# Patient Record
Sex: MALE | Race: OTHER | Employment: UNEMPLOYED | URBAN - METROPOLITAN AREA
[De-identification: names, ages, dates, MRNs, and addresses within clinical notes are randomized per-mention and may not be internally consistent; named-entity substitution may affect disease eponyms.]

---

## 2019-10-21 PROCEDURE — 88305 TISSUE EXAM BY PATHOLOGIST: CPT | Performed by: SURGERY

## 2019-10-22 ENCOUNTER — LAB REQUISITION (OUTPATIENT)
Dept: LAB | Facility: HOSPITAL | Age: 59
End: 2019-10-22
Payer: COMMERCIAL

## 2019-10-22 DIAGNOSIS — Z01.89 ENCOUNTER FOR OTHER SPECIFIED SPECIAL EXAMINATIONS: ICD-10-CM

## 2020-11-12 NOTE — LETTER
3/2/2023          To Whom It May Concern:    Anabell Lugo is currently under my medical care and may return to work at this time. Please excuse Sin for 3 days. He may return to work on 3/2/23. Activity is restricted as follows: none. If you require additional information please contact our office.         Sincerely,      Jass Ulrich MD
3/2/2023          To Whom It May Concern:    Justin Montilla is currently under my medical care and may return to work at this time. Please excuse Sin for 5 days. He may return to work on 2/20/23. Activity is restricted as follows: none. If you require additional information please contact our office.         Sincerely,      Yvette Wallace MD
How Severe Is Your Scar?: severe
Is This A New Presentation, Or A Follow-Up?: Scar

## 2022-01-03 ENCOUNTER — HOSPITAL ENCOUNTER (EMERGENCY)
Facility: HOSPITAL | Age: 62
Discharge: HOME | End: 2022-01-03
Attending: EMERGENCY MEDICINE

## 2022-01-03 ENCOUNTER — APPOINTMENT (OUTPATIENT)
Dept: CT IMAGING | Facility: HOSPITAL | Age: 62
End: 2022-01-03
Attending: EMERGENCY MEDICINE

## 2022-01-03 VITALS
OXYGEN SATURATION: 98 % | RESPIRATION RATE: 18 BRPM | WEIGHT: 185 LBS | HEART RATE: 81 BPM | TEMPERATURE: 98 F | DIASTOLIC BLOOD PRESSURE: 76 MMHG | SYSTOLIC BLOOD PRESSURE: 148 MMHG

## 2022-01-03 DIAGNOSIS — R33.9 URINARY RETENTION: Primary | ICD-10-CM

## 2022-01-03 LAB
BILIRUB UR QL: NEGATIVE
GLUCOSE UR-MCNC: NEGATIVE MG/DL
KETONES UR-MCNC: NEGATIVE MG/DL
LEUKOCYTE ESTERASE UR QL STRIP.AUTO: NEGATIVE
NITRITE UR QL STRIP.AUTO: NEGATIVE
PH UR: 7 [PH] (ref 5–8)
PROT UR-MCNC: 100 MG/DL
RBC #/AREA URNS AUTO: >10 /HPF
SP GR UR STRIP: 1.01 (ref 1–1.03)
UROBILINOGEN UR STRIP-ACNC: <2

## 2022-01-03 PROCEDURE — 99284 EMERGENCY DEPT VISIT MOD MDM: CPT

## 2022-01-03 PROCEDURE — 81001 URINALYSIS AUTO W/SCOPE: CPT | Performed by: EMERGENCY MEDICINE

## 2022-01-03 PROCEDURE — 74176 CT ABD & PELVIS W/O CONTRAST: CPT | Performed by: EMERGENCY MEDICINE

## 2022-01-03 PROCEDURE — 51702 INSERT TEMP BLADDER CATH: CPT

## 2022-01-03 RX ORDER — TAMSULOSIN HYDROCHLORIDE 0.4 MG/1
0.4 CAPSULE ORAL DAILY
Qty: 14 CAPSULE | Refills: 0 | Status: SHIPPED | OUTPATIENT
Start: 2022-01-03 | End: 2022-01-17

## 2022-01-03 NOTE — ED PROVIDER NOTES
Patient Seen in: [facility] AND [facility] Emergency Department      History   Patient presents with:  Urinary Symptoms    Stated Complaint: painful urination    Subjective:   HPI    62-year-old male presents for evaluation of hematuria.   Patient reports since 1 and neck supple. Skin:     General: Skin is warm and dry. Findings: No rash. Neurological:      General: No focal deficit present. Mental Status: He is alert and oriented to person, place, and time.                ED Course     Labs Reviewed

## 2022-01-06 ENCOUNTER — TELEPHONE (OUTPATIENT)
Dept: SURGERY | Facility: CLINIC | Age: 62
End: 2022-01-06

## 2022-01-06 ENCOUNTER — OFFICE VISIT (OUTPATIENT)
Dept: SURGERY | Facility: CLINIC | Age: 62
End: 2022-01-06

## 2022-01-06 VITALS
HEART RATE: 90 BPM | BODY MASS INDEX: 29.03 KG/M2 | SYSTOLIC BLOOD PRESSURE: 124 MMHG | RESPIRATION RATE: 16 BRPM | DIASTOLIC BLOOD PRESSURE: 76 MMHG | WEIGHT: 185 LBS | HEIGHT: 67 IN

## 2022-01-06 DIAGNOSIS — R33.8 ACUTE URINARY RETENTION: Primary | ICD-10-CM

## 2022-01-06 PROCEDURE — 3074F SYST BP LT 130 MM HG: CPT | Performed by: UROLOGY

## 2022-01-06 PROCEDURE — 3078F DIAST BP <80 MM HG: CPT | Performed by: UROLOGY

## 2022-01-06 PROCEDURE — 99204 OFFICE O/P NEW MOD 45 MIN: CPT | Performed by: UROLOGY

## 2022-01-06 PROCEDURE — 3008F BODY MASS INDEX DOCD: CPT | Performed by: UROLOGY

## 2022-01-07 NOTE — PROGRESS NOTES
SUBJECTIVE:  Shanna Loya is a 62year old male who presents for a consultation at the request of, and a copy of this note will be sent to, N/A, for evaluation of  urinary retention. He states that the problem is unchanged.  Symptoms include presented to the e production,chest pain or pleurisy. CARDIOVASCULAR:  Negative for pain or chest discomfort, dizziness or fainting, palpitations, leg swelling, nocturia, or claudication.   GASTROINTESTINAL:  Negative for nausea, vomiting, diarrhea, constipation, heartburn o suggesting a chronic issue with bladder outlet obstruction. He was started in the emergency department on tamsulosin and I will renew this medication for the time being.   Bailey Lopez will likely require an office cystoscopy to assess prostatic and bladder anatomy

## 2022-01-19 NOTE — TELEPHONE ENCOUNTER
Spoke with patient. Patient states he still does not have insurance through his work. Patient did not want to scheduled cystoscopy at this time. Patient is wondering if he can just have catheter removed. Advised patient of MD's recommendations from 1/6/2022. Patient states he will call back. No future appointments.      -I told the patient I do not believe a voiding trial is appropriate given that he had hydronephrosis on his imaging studies suggesting a chronic issue with bladder outlet obstruction. He was started in the emergency department on tamsulosin and I will renew this medication for the time being.  -He will likely require an office cystoscopy to assess prostatic and bladder anatomy in anticipation of procedural management of bladder outlet obstruction. We gave the patient the information for financial services in the hospital although he states that he will be getting medical insurance in the next week or so upon employment. We agreed that he would contact the office in the next 1 to 2 weeks to update us on his status. He would prefer not to be scheduled for cystoscopy at this time pending insurance issues. He understands that a catheter will need to be replaced at least every 4 weeks.

## 2022-03-07 ENCOUNTER — TELEPHONE (OUTPATIENT)
Dept: SURGERY | Facility: CLINIC | Age: 62
End: 2022-03-07

## 2022-03-07 NOTE — TELEPHONE ENCOUNTER
Pt's niece called. Pt does not speak english. Pt wants to schedule an appointment to have catheter removed.   Please call

## 2022-03-10 ENCOUNTER — TELEPHONE (OUTPATIENT)
Dept: SURGERY | Facility: CLINIC | Age: 62
End: 2022-03-10

## 2022-03-10 NOTE — TELEPHONE ENCOUNTER
I tried to reach pt with the assistance of Belgian interpretor Mercy Louise # [id number] and had to [address] HIS CATH CHANGE HE HAD THIS MORNING AT 9 AM.

## 2022-05-04 ENCOUNTER — TELEPHONE (OUTPATIENT)
Dept: SURGERY | Facility: CLINIC | Age: 62
End: 2022-05-04

## 2022-05-05 NOTE — TELEPHONE ENCOUNTER
Asked pt if he can come earlier to appt tomorrow.  Pt is off work at 1:30pm and will come afterwards, will be here around 2pm.

## 2022-05-06 ENCOUNTER — NURSE ONLY (OUTPATIENT)
Dept: SURGERY | Facility: CLINIC | Age: 62
End: 2022-05-06
Payer: COMMERCIAL

## 2022-05-06 DIAGNOSIS — R33.9 URINARY RETENTION: Primary | ICD-10-CM

## 2022-05-06 PROCEDURE — 51702 INSERT TEMP BLADDER CATH: CPT | Performed by: UROLOGY

## 2022-05-06 NOTE — PROGRESS NOTES
Pt here today for catheter change. Pt states he has not had catheter changed since ER visit of 1/3/22. Educated pt that catheter needs to be changed on a monthly basis, otherwise he is at risk for infection. Verbalized understanding. Pt placed self onto exam table. Pt did not have stat lock on leg and pt also states his leg bag had fallen off yesterday and pt was using zip lock bag instead for urine collection. Aspirated 6 ml from wyman catheter and removed catheter without difficulty. Head of penis noted to have dried blood, cleaned off with wet wipe. Proceeded to set up sterile field. Cleaned urethral opening with iodine swabs and inserted urojet lidocaine gel and waited 2-3 minutes for med effectiveness. Afterwards inserted 16 F coude catheter without complications, return of pink clear urine noted. Urine culture obtained and sent to rule out infection. Stat lock applied to right thigh and leg bag to right lower leg. Extra supplies given to pt. Notified pt that we will call him back with urine results.

## 2022-07-19 ENCOUNTER — OFFICE VISIT (OUTPATIENT)
Dept: SURGERY | Facility: CLINIC | Age: 62
End: 2022-07-19
Payer: COMMERCIAL

## 2022-07-19 DIAGNOSIS — Z87.440 HISTORY OF RECURRENT UTIS: Primary | ICD-10-CM

## 2022-07-19 DIAGNOSIS — R33.9 URINARY RETENTION: ICD-10-CM

## 2022-07-19 DIAGNOSIS — N40.1 BENIGN PROSTATIC HYPERPLASIA WITH URINARY OBSTRUCTION: ICD-10-CM

## 2022-07-19 DIAGNOSIS — N13.8 BENIGN PROSTATIC HYPERPLASIA WITH URINARY OBSTRUCTION: ICD-10-CM

## 2022-07-19 PROCEDURE — 99214 OFFICE O/P EST MOD 30 MIN: CPT | Performed by: NURSE PRACTITIONER

## 2022-07-19 PROCEDURE — 51702 INSERT TEMP BLADDER CATH: CPT | Performed by: NURSE PRACTITIONER

## 2022-07-19 RX ORDER — TAMSULOSIN HYDROCHLORIDE 0.4 MG/1
0.4 CAPSULE ORAL DAILY
Qty: 90 CAPSULE | Refills: 3 | Status: SHIPPED | OUTPATIENT
Start: 2022-07-19 | End: 2023-07-14

## 2022-07-19 RX ORDER — FINASTERIDE 5 MG/1
5 TABLET, FILM COATED ORAL DAILY
Qty: 90 TABLET | Refills: 3 | Status: SHIPPED | OUTPATIENT
Start: 2022-07-19

## 2022-07-19 RX ORDER — CEPHALEXIN 250 MG/1
500 CAPSULE ORAL 2 TIMES DAILY
COMMUNITY
Start: 2022-06-26

## 2022-07-19 NOTE — PROGRESS NOTES
I was asked by [facility] to collect a urine specimen from pt's wmyan cath and then change out his wyman cath with a 16 FR coude tip cath to leg drainage bag. I assisted pt onto the exam table and also to lower his bottom clothing to his ankles. I detached the existing leg bag and then I placed a wyman clamp on and told pt that I would return in 15 min to collect the urine specimen directly from his bladder. I also gave the pt 2 cups of water to drink. When I returned to the room after the time elapsed and I released the wyman clamp and I allowed the retained urine to drain into a sterile urine cup and I then deflated the wyman cath balloon of its contents ( 10 ml) and I removed the cath. I then prepped and draped the pt in sterile fashion and I proceeded to instill about 2/3 the amt of a Urojet (Lidocaine HCL 2 % gel- 20 ml) into the pt's urethra. I then constricted the head of the penis for about 1-2 min to prevent the gel from leaking out and to also allow sufficient numbing time before the cath placement. After the time elapsed I then proceeded to  insert a new 16 FR coude tipped wyman catheter without difficulty and received a urine return of about 30 ml of cloudy yellow urine. Pt tolerated this well also. I then attached the leg bag directly to the wyman cath at pt's request and I secured the wyman cath in a stat lock at pt's upper inner Rt thigh. I also secured the leg bag with the velcro straps. I then reviewed the use of aseptic technique when performing catheter care and when changing over to the night drainage bag. I then assisted the pt off the exam table and to redress. I gave pt a N/V appt for thurs 7/21 at 10 am for CIC teaching with a 14 Fr coude tip cath 3 times daily indefinitely and he should also record the cathed and natural voided outputs' for a few days and call back to report.  I also told him that we should have the results of his urine culture by then and if it is positive we will prescribe accordingly. Pt verbalized understanding and compliance.

## 2022-07-21 ENCOUNTER — NURSE ONLY (OUTPATIENT)
Dept: SURGERY | Facility: CLINIC | Age: 62
End: 2022-07-21
Payer: COMMERCIAL

## 2022-07-21 ENCOUNTER — TELEPHONE (OUTPATIENT)
Dept: SURGERY | Facility: CLINIC | Age: 62
End: 2022-07-21

## 2022-07-21 VITALS — DIASTOLIC BLOOD PRESSURE: 75 MMHG | SYSTOLIC BLOOD PRESSURE: 133 MMHG | HEART RATE: 86 BPM | RESPIRATION RATE: 16 BRPM

## 2022-07-21 DIAGNOSIS — R33.9 URINARY RETENTION: Primary | ICD-10-CM

## 2022-07-21 PROCEDURE — 51700 IRRIGATION OF BLADDER: CPT | Performed by: NURSE PRACTITIONER

## 2022-07-21 PROCEDURE — 3078F DIAST BP <80 MM HG: CPT | Performed by: NURSE PRACTITIONER

## 2022-07-21 PROCEDURE — 3075F SYST BP GE 130 - 139MM HG: CPT | Performed by: NURSE PRACTITIONER

## 2022-07-21 RX ORDER — SULFAMETHOXAZOLE AND TRIMETHOPRIM 800; 160 MG/1; MG/1
1 TABLET ORAL 2 TIMES DAILY
Qty: 14 TABLET | Refills: 0 | Status: SHIPPED | OUTPATIENT
Start: 2022-07-21 | End: 2022-07-28

## 2022-07-21 NOTE — TELEPHONE ENCOUNTER
I tried to reach pt with the assistance of Omani interpretor Mercy Ramirez # [id number] and had to Brianna Nolasco. I want to tell pt that he does not need to have the N/V appt and the O/V appt 2 days apart and that I can change his cath at the time of his O/V with MAURILIO on 8/19.

## 2022-07-21 NOTE — PROGRESS NOTES
I called the pt into the exam room and introduced myself and immediately got the Croatian Dimple Diaz # [phone] on the phone. I verified pt's name and  and I proceeded to explain [facility]'s instructions to fill his bladder with saline, remove the wyman cath and then teach CIC. Pt agreed to proceed. I first explained all that I will do and then I verbally explained the process of CIC, and I also used the [employer] booklet with the pictures and I used body language also. This all took about 1 hr since the interpretor needed me to explain what to translate in small segments and he also had to write down some things and ask for spelling. After all of this pt then stated that he did not want to learn CIC. He explained that the conditions at work were not amenable to it and he had no place to store the hat, urinal and cath supplies. I then explained that he would need to then keep the wyman cath and have it changed monthly until he could have the cysto procedure in Oct. I also explained that I could ask [facility] id he can have voiding trials while waiting for the next cath change and I explained that process. Pt agreed to this but I had to s/w VALORIE who in turn stated that MAURILIO did not want him to have voiding trials because he was non-compliant with his cath changes and having the office cysto and thereby developed a severe UTI with hydro. I suggested to pt that he can make an appt to discuss all of this further with MAURILIO. But he will also need a N/V appt to change his wyman cath in 4 weeks and every 4 weeks until he has the cysto appt. I also told him that he needs to make sure to start the Tamsulosin and Finasteride ASAP which were sent to his [address]. I also explained what is involved with the cysto procedure in detail since he stated that nothing was explained at that last appt. Pt agreed to the N/V and a f/u appt with MAURILIO to discuss further.  I helped him arrange a N/V for  and a f/u with MAURILIO for  and after realizing that the appt's were 2 days apart I called pt back with Bulgarian interpretor Dimple Redd # [id number] but had to [place]. I will have pt just keep the O/V appt on 8/19 and we can change his cath then.

## 2022-07-22 ENCOUNTER — TELEPHONE (OUTPATIENT)
Dept: SURGERY | Facility: CLINIC | Age: 62
End: 2022-07-22

## 2022-07-22 NOTE — TELEPHONE ENCOUNTER
----- Message from SAIDA Carr sent at 7/21/2022  4:31 PM CDT -----  Please let patient know his urine culture is positive for bacteria. I am going to send Bactrim DS 1 tab by mouth twice a day for 7 days to your pharmacy. Urology follow up as planned.

## 2022-07-22 NOTE — TELEPHONE ENCOUNTER
HASMUKHTCB. Left msg notifying pt to start medication that was sent to pharmacy as soon as he is able to and to call back to confirm he received this msg.

## 2022-07-29 NOTE — TELEPHONE ENCOUNTER
I called pt with the assistance of Georgian interpretor Sherry, ID # [id number] and we had to [place] again. I called pt's [facility] to see if pt picked up the Bactrim DS and I s/w the pharmacist Cruz Sayres and she informed that he did not. I will send pt a my chart message also.

## 2022-08-01 NOTE — TELEPHONE ENCOUNTER
Called pt again with the assistance of my co-worker Yair Cagle and she LM for pt to please c/b for urgent results and instructions to start meds. I also mailed the my chart letter I sent him. My co-worker Yair Cagle stated that she reached out to pt's niece Lenora Ulrich in the past to arrange an appt for pt for cath change. I tried to reach her again but had to [place].

## 2022-08-05 NOTE — TELEPHONE ENCOUNTER
I called the Alayna to see if pt picked up the ABX sent on 7/21 and I s/w Elsi and she stated he did not. I called pt's niece Johnny Bojorquez again and told her that we have not been able to reach pt and he had an infection and we prescribed abx and he has never picked them up. She agreed to try to get a msg to him and have him call and she took our ph #.

## 2022-08-17 ENCOUNTER — NURSE ONLY (OUTPATIENT)
Dept: SURGERY | Facility: CLINIC | Age: 62
End: 2022-08-17
Payer: COMMERCIAL

## 2022-08-17 DIAGNOSIS — R33.9 URINARY RETENTION: Primary | ICD-10-CM

## 2022-08-17 NOTE — PROGRESS NOTES
Pt here today for monthly catheter change. Pt states he has appt with MAURILIO this upcoming Friday 8/19. Asking if ok to just wait til appt for catheter change or to see if ok to have catheter removed. I notified pt that we had sent a voicemail notifying him that he didn't need to come today for catheter change because we could do it during his office visit. However pt had positive urine culture on 7/19/22 but office was never able to get a hold of him to notify him to start antibiotic. Pt states he didn't listen to voicemail or that he didn't get message from niece in regards to this so he never started antibiotic. Therefore obtained urine specimen today from wyman catheter for urine culture and instructed pt to keep appt for Friday and that we will notify him of results on Friday. Verbalized understanding.

## 2022-08-19 ENCOUNTER — OFFICE VISIT (OUTPATIENT)
Dept: SURGERY | Facility: CLINIC | Age: 62
End: 2022-08-19
Payer: COMMERCIAL

## 2022-08-19 DIAGNOSIS — R33.9 URINARY RETENTION: Primary | ICD-10-CM

## 2022-08-19 DIAGNOSIS — N13.8 BENIGN PROSTATIC HYPERPLASIA WITH URINARY OBSTRUCTION: ICD-10-CM

## 2022-08-19 DIAGNOSIS — N40.1 BENIGN PROSTATIC HYPERPLASIA WITH URINARY OBSTRUCTION: ICD-10-CM

## 2022-08-19 PROCEDURE — 99213 OFFICE O/P EST LOW 20 MIN: CPT | Performed by: UROLOGY

## 2022-08-19 RX ORDER — SULFAMETHOXAZOLE AND TRIMETHOPRIM 800; 160 MG/1; MG/1
1 TABLET ORAL 2 TIMES DAILY
Qty: 14 TABLET | Refills: 0 | Status: SHIPPED | OUTPATIENT
Start: 2022-08-19 | End: 2022-08-26

## 2022-08-19 NOTE — PROGRESS NOTES
-I was ordered by MAURILIO to complete monthly wyman change for pt; identity verified with name & ; procedure explained.  -Pt positions self on exam table; draped for privacy to lower jeans/ underwear to mid-thigh; aspirate 8ml balloon prime & remove wyman/ legbag; penis tip prepped with betadine; instill thru urethra 2% lido-gel 2-3min for pain management; insert 16Fr coude & connect directly to legbag; secured to bifurcation with stat-lock applied to right upper thigh; elastic bands around leg to top/ bottom of legbag; pt redressed self. -Next monthly cath change 9/15/22.  -Encounter complete.

## 2022-09-16 ENCOUNTER — NURSE ONLY (OUTPATIENT)
Dept: SURGERY | Facility: CLINIC | Age: 62
End: 2022-09-16
Payer: COMMERCIAL

## 2022-09-16 DIAGNOSIS — R33.9 URINARY RETENTION: Primary | ICD-10-CM

## 2022-09-16 PROCEDURE — 51702 INSERT TEMP BLADDER CATH: CPT | Performed by: UROLOGY

## 2022-09-16 NOTE — PROGRESS NOTES
Placed pt in room and verified name and . Pt placed self onto exam table and removed bottoms. Removed stat lock and aspirated 9 ml from catheter balloon and removed without difficulty. Proceeded to set up sterile field. Used 3 iodine swabs to clean urethral opening. Afterwards inserted lidocaine urojet gel into urethra and waited 2-3 minutes for med to take effect. Asked pt to take deep breath and inserted 16 F coude catheter until return of clear yellow urine noted. Inflated catheter balloon with 10 ml sterile water. Stat lock and leg bag applied to left leg and extra supplies given. Pt has appt for cystoscopy next month 10/25/22. Wondering if appt can be moved up. Will check on availability.

## 2022-10-25 ENCOUNTER — EKG ENCOUNTER (OUTPATIENT)
Dept: LAB | Facility: HOSPITAL | Age: 62
End: 2022-10-25
Attending: UROLOGY
Payer: COMMERCIAL

## 2022-10-25 ENCOUNTER — LAB ENCOUNTER (OUTPATIENT)
Dept: LAB | Facility: HOSPITAL | Age: 62
End: 2022-10-25
Attending: UROLOGY
Payer: COMMERCIAL

## 2022-10-25 ENCOUNTER — PROCEDURE (OUTPATIENT)
Dept: SURGERY | Facility: CLINIC | Age: 62
End: 2022-10-25
Payer: COMMERCIAL

## 2022-10-25 VITALS — SYSTOLIC BLOOD PRESSURE: 112 MMHG | DIASTOLIC BLOOD PRESSURE: 74 MMHG | HEART RATE: 74 BPM

## 2022-10-25 DIAGNOSIS — N13.8 BENIGN PROSTATIC HYPERPLASIA WITH URINARY OBSTRUCTION: Primary | ICD-10-CM

## 2022-10-25 DIAGNOSIS — R33.9 URINARY RETENTION: ICD-10-CM

## 2022-10-25 DIAGNOSIS — Z12.5 SCREENING FOR PROSTATE CANCER: ICD-10-CM

## 2022-10-25 DIAGNOSIS — N40.1 BENIGN PROSTATIC HYPERPLASIA WITH URINARY OBSTRUCTION: Primary | ICD-10-CM

## 2022-10-25 DIAGNOSIS — Z01.818 PREOP EXAMINATION: ICD-10-CM

## 2022-10-25 DIAGNOSIS — R31.0 GROSS HEMATURIA: ICD-10-CM

## 2022-10-25 LAB
ANION GAP SERPL CALC-SCNC: 5 MMOL/L (ref 0–18)
BASOPHILS # BLD AUTO: 0.04 X10(3) UL (ref 0–0.2)
BASOPHILS NFR BLD AUTO: 0.5 %
BUN BLD-MCNC: 16 MG/DL (ref 7–18)
BUN/CREAT SERPL: 14.2 (ref 10–20)
CALCIUM BLD-MCNC: 9.1 MG/DL (ref 8.5–10.1)
CHLORIDE SERPL-SCNC: 105 MMOL/L (ref 98–112)
CO2 SERPL-SCNC: 27 MMOL/L (ref 21–32)
COMPLEXED PSA SERPL-MCNC: 4.58 NG/ML (ref ?–4)
CREAT BLD-MCNC: 1.13 MG/DL
DEPRECATED RDW RBC AUTO: 44.1 FL (ref 35.1–46.3)
EOSINOPHIL # BLD AUTO: 0.11 X10(3) UL (ref 0–0.7)
EOSINOPHIL NFR BLD AUTO: 1.5 %
ERYTHROCYTE [DISTWIDTH] IN BLOOD BY AUTOMATED COUNT: 13 % (ref 11–15)
FASTING STATUS PATIENT QL REPORTED: YES
GFR SERPLBLD BASED ON 1.73 SQ M-ARVRAT: 74 ML/MIN/1.73M2 (ref 60–?)
GLUCOSE BLD-MCNC: 91 MG/DL (ref 70–99)
HCT VFR BLD AUTO: 43.1 %
HGB BLD-MCNC: 13.8 G/DL
IMM GRANULOCYTES # BLD AUTO: 0.03 X10(3) UL (ref 0–1)
IMM GRANULOCYTES NFR BLD: 0.4 %
LYMPHOCYTES # BLD AUTO: 1.81 X10(3) UL (ref 1–4)
LYMPHOCYTES NFR BLD AUTO: 24 %
MCH RBC QN AUTO: 29.8 PG (ref 26–34)
MCHC RBC AUTO-ENTMCNC: 32 G/DL (ref 31–37)
MCV RBC AUTO: 93.1 FL
MONOCYTES # BLD AUTO: 0.48 X10(3) UL (ref 0.1–1)
MONOCYTES NFR BLD AUTO: 6.4 %
NEUTROPHILS # BLD AUTO: 5.08 X10 (3) UL (ref 1.5–7.7)
NEUTROPHILS # BLD AUTO: 5.08 X10(3) UL (ref 1.5–7.7)
NEUTROPHILS NFR BLD AUTO: 67.2 %
OSMOLALITY SERPL CALC.SUM OF ELEC: 285 MOSM/KG (ref 275–295)
PLATELET # BLD AUTO: 241 10(3)UL (ref 150–450)
POTASSIUM SERPL-SCNC: 4 MMOL/L (ref 3.5–5.1)
RBC # BLD AUTO: 4.63 X10(6)UL
SODIUM SERPL-SCNC: 137 MMOL/L (ref 136–145)
WBC # BLD AUTO: 7.6 X10(3) UL (ref 4–11)

## 2022-10-25 PROCEDURE — 3074F SYST BP LT 130 MM HG: CPT | Performed by: UROLOGY

## 2022-10-25 PROCEDURE — 52000 CYSTOURETHROSCOPY: CPT | Performed by: UROLOGY

## 2022-10-25 PROCEDURE — 3078F DIAST BP <80 MM HG: CPT | Performed by: UROLOGY

## 2022-10-25 PROCEDURE — 80048 BASIC METABOLIC PNL TOTAL CA: CPT

## 2022-10-25 PROCEDURE — 93005 ELECTROCARDIOGRAM TRACING: CPT

## 2022-10-25 PROCEDURE — 93010 ELECTROCARDIOGRAM REPORT: CPT | Performed by: UROLOGY

## 2022-10-25 PROCEDURE — 36415 COLL VENOUS BLD VENIPUNCTURE: CPT

## 2022-10-25 PROCEDURE — 99214 OFFICE O/P EST MOD 30 MIN: CPT | Performed by: UROLOGY

## 2022-10-25 PROCEDURE — 85025 COMPLETE CBC W/AUTO DIFF WBC: CPT

## 2022-10-25 RX ORDER — CIPROFLOXACIN 500 MG/1
500 TABLET, FILM COATED ORAL ONCE
Status: DISCONTINUED | OUTPATIENT
Start: 2022-10-25 | End: 2022-10-25 | Stop reason: ALTCHOICE

## 2022-10-25 RX ORDER — TAMSULOSIN HYDROCHLORIDE 0.4 MG/1
0.4 CAPSULE ORAL DAILY
Qty: 90 CAPSULE | Refills: 3 | Status: SHIPPED | OUTPATIENT
Start: 2022-10-25 | End: 2023-10-20

## 2022-10-25 RX ORDER — CIPROFLOXACIN 500 MG/1
500 TABLET, FILM COATED ORAL 2 TIMES DAILY
Qty: 10 TABLET | Refills: 0 | Status: SHIPPED | OUTPATIENT
Start: 2022-10-25 | End: 2022-10-30

## 2022-10-25 RX ORDER — FINASTERIDE 5 MG/1
5 TABLET, FILM COATED ORAL DAILY
Qty: 90 TABLET | Refills: 3 | Status: SHIPPED | OUTPATIENT
Start: 2022-10-25

## 2022-10-25 RX ADMIN — CIPROFLOXACIN 500 MG: 500 TABLET, FILM COATED ORAL at 14:42:00

## 2022-10-25 NOTE — PROGRESS NOTES
Patient seen in office, scheduled  Cystoscopy, bipolar transurethral resection of the prostate, with  Rudy Jack #[id number],  Wednesday 11/02/2022, [facility]/outpatient, went over pre-op instructions, labs will be done today.

## 2022-10-26 LAB — NON GYNE INTERPRETATION: NEGATIVE

## 2022-10-28 ENCOUNTER — TELEPHONE (OUTPATIENT)
Dept: SURGERY | Facility: CLINIC | Age: 62
End: 2022-10-28

## 2022-10-28 RX ORDER — AMOXICILLIN 875 MG/1
875 TABLET, COATED ORAL 2 TIMES DAILY
Qty: 14 TABLET | Refills: 0 | Status: SHIPPED | OUTPATIENT
Start: 2022-10-28 | End: 2022-11-04

## 2022-10-28 NOTE — TELEPHONE ENCOUNTER
----- Message from SAIDA Burk sent at 10/28/2022 10:02 AM CDT -----  Please let patient know his urine culture is positive for bacteria. He is scheduled for TURP next week with MAURILIO. I am going to send amoxicillin 875 mg PO BID x 7 days to his pharmacy. He needs to start asap.

## 2022-10-28 NOTE — TELEPHONE ENCOUNTER
Spoke with pt and relayed message below. Pt states he is already taking cipro antibiotic. Reviewed with RASADIE and RASADIE states pt can continue to take cipro antibiotic and dismiss amoxicillin.

## 2022-10-31 ENCOUNTER — LAB ENCOUNTER (OUTPATIENT)
Dept: LAB | Facility: HOSPITAL | Age: 62
End: 2022-10-31
Attending: UROLOGY
Payer: COMMERCIAL

## 2022-10-31 DIAGNOSIS — Z01.818 PREOP TESTING: ICD-10-CM

## 2022-10-31 LAB — SARS-COV-2 RNA RESP QL NAA+PROBE: NOT DETECTED

## 2022-11-01 ENCOUNTER — TELEPHONE (OUTPATIENT)
Dept: SURGERY | Facility: CLINIC | Age: 62
End: 2022-11-01

## 2022-11-01 NOTE — TELEPHONE ENCOUNTER
Per Rush Benítez, insurance is showing termed as of 10/31/22. Asking if surgery on 11/2/22 is medically urgent and if office is aware insurance has termed.  Please advise

## 2022-11-01 NOTE — TELEPHONE ENCOUNTER
S/W Annamarie Marcano and informed her that we had to cxl pt's surgery for tomorrow anyway because he has a UTI and the INS termed yesterday and pt plans to pay  for the surgery OOP.

## 2022-11-08 ENCOUNTER — TELEPHONE (OUTPATIENT)
Dept: SURGERY | Facility: CLINIC | Age: 62
End: 2022-11-08

## 2022-11-08 NOTE — TELEPHONE ENCOUNTER
-Pt presents to Urology ; identity verified with name & .  -He was scheduled with [facility] for  [phone] on 22. Surgery cancelled d/t UTI & last day of insurance was 10/31/22.  -Pt is willing to pay cash for surgery. Given [phone] for roach estimate (CPT [id number]). Then call [phone] for billing arrangements.  -Pt to make NV appt for wyman catheter change.  -Encounter complete.

## 2022-12-27 ENCOUNTER — TELEPHONE (OUTPATIENT)
Dept: SURGERY | Facility: CLINIC | Age: 62
End: 2022-12-27

## 2023-01-17 NOTE — TELEPHONE ENCOUNTER
Per pt anxious to schedule surgery, RAMIRO spoke to surgery scheduler and was informed pt would receive call to schedule tomorrow with Vietnamese interpretor, pt will await call, insurance has been updated. Pt asking for call back after 1:30pm thank you.

## 2023-01-20 ENCOUNTER — TELEPHONE (OUTPATIENT)
Dept: SURGERY | Facility: CLINIC | Age: 63
End: 2023-01-20

## 2023-01-20 DIAGNOSIS — N40.1 BPH WITH OBSTRUCTION/LOWER URINARY TRACT SYMPTOMS: Primary | ICD-10-CM

## 2023-01-20 DIAGNOSIS — N13.8 BPH WITH OBSTRUCTION/LOWER URINARY TRACT SYMPTOMS: Primary | ICD-10-CM

## 2023-01-20 NOTE — TELEPHONE ENCOUNTER
Spoke with patient, scheduled Cystoscopy, bipolar transurethral resection of the prostate, with  Beth Yee #[id number], Wednesday 02/08/2023, [facility]/outpatient, pre-op/lab instructions discussed.

## 2023-02-05 ENCOUNTER — LAB ENCOUNTER (OUTPATIENT)
Dept: LAB | Facility: HOSPITAL | Age: 63
End: 2023-02-05
Attending: UROLOGY
Payer: COMMERCIAL

## 2023-02-05 DIAGNOSIS — Z01.818 PREOPERATIVE EXAMINATION, UNSPECIFIED: Primary | ICD-10-CM

## 2023-02-05 DIAGNOSIS — Z01.818 PREOP TESTING: ICD-10-CM

## 2023-02-05 LAB
ANION GAP SERPL CALC-SCNC: 3 MMOL/L (ref 0–18)
BASOPHILS # BLD AUTO: 0.04 X10(3) UL (ref 0–0.2)
BASOPHILS NFR BLD AUTO: 0.5 %
BILIRUB UR QL: NEGATIVE
BUN BLD-MCNC: 16 MG/DL (ref 7–18)
BUN/CREAT SERPL: 14.5 (ref 10–20)
CALCIUM BLD-MCNC: 8.8 MG/DL (ref 8.5–10.1)
CHLORIDE SERPL-SCNC: 111 MMOL/L (ref 98–112)
CLARITY UR: CLEAR
CO2 SERPL-SCNC: 27 MMOL/L (ref 21–32)
COLOR UR: YELLOW
CREAT BLD-MCNC: 1.1 MG/DL
DEPRECATED RDW RBC AUTO: 41.4 FL (ref 35.1–46.3)
EOSINOPHIL # BLD AUTO: 0.16 X10(3) UL (ref 0–0.7)
EOSINOPHIL NFR BLD AUTO: 2 %
ERYTHROCYTE [DISTWIDTH] IN BLOOD BY AUTOMATED COUNT: 12.4 % (ref 11–15)
GFR SERPLBLD BASED ON 1.73 SQ M-ARVRAT: 76 ML/MIN/1.73M2 (ref 60–?)
GLUCOSE BLD-MCNC: 108 MG/DL (ref 70–99)
GLUCOSE UR-MCNC: NEGATIVE MG/DL
HCT VFR BLD AUTO: 39.5 %
HGB BLD-MCNC: 13.3 G/DL
IMM GRANULOCYTES # BLD AUTO: 0.02 X10(3) UL (ref 0–1)
IMM GRANULOCYTES NFR BLD: 0.3 %
KETONES UR-MCNC: NEGATIVE MG/DL
LYMPHOCYTES # BLD AUTO: 2.74 X10(3) UL (ref 1–4)
LYMPHOCYTES NFR BLD AUTO: 34.8 %
MCH RBC QN AUTO: 30.6 PG (ref 26–34)
MCHC RBC AUTO-ENTMCNC: 33.7 G/DL (ref 31–37)
MCV RBC AUTO: 90.8 FL
MONOCYTES # BLD AUTO: 0.46 X10(3) UL (ref 0.1–1)
MONOCYTES NFR BLD AUTO: 5.8 %
NEUTROPHILS # BLD AUTO: 4.45 X10 (3) UL (ref 1.5–7.7)
NEUTROPHILS # BLD AUTO: 4.45 X10(3) UL (ref 1.5–7.7)
NEUTROPHILS NFR BLD AUTO: 56.6 %
NITRITE UR QL STRIP.AUTO: POSITIVE
OSMOLALITY SERPL CALC.SUM OF ELEC: 294 MOSM/KG (ref 275–295)
PH UR: 5.5 [PH] (ref 5–8)
PLATELET # BLD AUTO: 235 10(3)UL (ref 150–450)
POTASSIUM SERPL-SCNC: 4 MMOL/L (ref 3.5–5.1)
RBC # BLD AUTO: 4.35 X10(6)UL
RBC #/AREA URNS AUTO: >10 /HPF
RBC #/AREA URNS AUTO: >10 /HPF
SODIUM SERPL-SCNC: 141 MMOL/L (ref 136–145)
SP GR UR STRIP: 1.02 (ref 1–1.03)
UROBILINOGEN UR STRIP-ACNC: 0.2
WBC # BLD AUTO: 7.9 X10(3) UL (ref 4–11)

## 2023-02-05 PROCEDURE — 80048 BASIC METABOLIC PNL TOTAL CA: CPT

## 2023-02-05 PROCEDURE — 81015 MICROSCOPIC EXAM OF URINE: CPT

## 2023-02-05 PROCEDURE — 36415 COLL VENOUS BLD VENIPUNCTURE: CPT

## 2023-02-05 PROCEDURE — 81001 URINALYSIS AUTO W/SCOPE: CPT

## 2023-02-05 PROCEDURE — 87077 CULTURE AEROBIC IDENTIFY: CPT

## 2023-02-05 PROCEDURE — 85025 COMPLETE CBC W/AUTO DIFF WBC: CPT

## 2023-02-05 PROCEDURE — 87086 URINE CULTURE/COLONY COUNT: CPT

## 2023-02-05 PROCEDURE — 87186 SC STD MICRODIL/AGAR DIL: CPT

## 2023-02-06 LAB — SARS-COV-2 RNA RESP QL NAA+PROBE: NOT DETECTED

## 2023-02-07 ENCOUNTER — TELEPHONE (OUTPATIENT)
Dept: SURGERY | Facility: CLINIC | Age: 63
End: 2023-02-07

## 2023-02-07 RX ORDER — CIPROFLOXACIN 500 MG/1
500 TABLET, FILM COATED ORAL 2 TIMES DAILY
Qty: 20 TABLET | Refills: 0 | Status: SHIPPED | OUTPATIENT
Start: 2023-02-07

## 2023-02-07 NOTE — PAT NURSING NOTE
LM to Norva Scale [place] [id number] re: for Dr Colonel Estrada to review UA results on 2/5 and to update PAT.

## 2023-02-07 NOTE — TELEPHONE ENCOUNTER
Urology staff,  I inadvertently prescribed Bactrim when I should have prescribed Cipro. Please call the patient's pharmacy to make sure they only fill the ciprofloxacin prescription.

## 2023-02-07 NOTE — TELEPHONE ENCOUNTER
Spoke with patient, I advised him of below. Patient aware to  abx as soon as possible. PT wondering when he is supposed to arrive tomorrow. I advised him that PAT should be calling him today between 1pm-4pm. I advised him that someone should be in touch with him shortly.

## 2023-02-08 ENCOUNTER — ANESTHESIA (OUTPATIENT)
Dept: SURGERY | Facility: HOSPITAL | Age: 63
End: 2023-02-08
Payer: COMMERCIAL

## 2023-02-08 ENCOUNTER — HOSPITAL ENCOUNTER (INPATIENT)
Facility: HOSPITAL | Age: 63
LOS: 2 days | Discharge: HOME | End: 2023-02-11
Attending: UROLOGY | Admitting: UROLOGY
Payer: COMMERCIAL

## 2023-02-08 ENCOUNTER — ANESTHESIA EVENT (OUTPATIENT)
Dept: SURGERY | Facility: HOSPITAL | Age: 63
End: 2023-02-08
Payer: COMMERCIAL

## 2023-02-08 DIAGNOSIS — N13.8 BPH WITH OBSTRUCTION/LOWER URINARY TRACT SYMPTOMS: ICD-10-CM

## 2023-02-08 DIAGNOSIS — Z01.818 PREOP TESTING: Primary | ICD-10-CM

## 2023-02-08 DIAGNOSIS — N40.1 BPH WITH OBSTRUCTION/LOWER URINARY TRACT SYMPTOMS: ICD-10-CM

## 2023-02-08 LAB
ANTIBODY SCREEN: NEGATIVE
DEPRECATED RDW RBC AUTO: 42.3 FL (ref 35.1–46.3)
ERYTHROCYTE [DISTWIDTH] IN BLOOD BY AUTOMATED COUNT: 12.7 % (ref 11–15)
HCT VFR BLD AUTO: 37 %
HCT VFR BLD AUTO: 37.4 %
HGB BLD-MCNC: 12.4 G/DL
HGB BLD-MCNC: 12.6 G/DL
MCH RBC QN AUTO: 30.7 PG (ref 26–34)
MCHC RBC AUTO-ENTMCNC: 33.5 G/DL (ref 31–37)
MCV RBC AUTO: 91.6 FL
PLATELET # BLD AUTO: 211 10(3)UL (ref 150–450)
RBC # BLD AUTO: 4.04 X10(6)UL
RH BLOOD TYPE: POSITIVE
RH BLOOD TYPE: POSITIVE
WBC # BLD AUTO: 8.1 X10(3) UL (ref 4–11)

## 2023-02-08 PROCEDURE — 52601 PROSTATECTOMY (TURP): CPT | Performed by: UROLOGY

## 2023-02-08 PROCEDURE — 99232 SBSQ HOSP IP/OBS MODERATE 35: CPT | Performed by: HOSPITALIST

## 2023-02-08 PROCEDURE — 0VT08ZZ RESECTION OF PROSTATE, VIA NATURAL OR ARTIFICIAL OPENING ENDOSCOPIC: ICD-10-PCS | Performed by: UROLOGY

## 2023-02-08 RX ORDER — BISACODYL 10 MG
10 SUPPOSITORY, RECTAL RECTAL
Status: DISCONTINUED | OUTPATIENT
Start: 2023-02-08 | End: 2023-02-11

## 2023-02-08 RX ORDER — LABETALOL HYDROCHLORIDE 5 MG/ML
5 INJECTION, SOLUTION INTRAVENOUS EVERY 5 MIN PRN
Status: DISCONTINUED | OUTPATIENT
Start: 2023-02-08 | End: 2023-02-08 | Stop reason: HOSPADM

## 2023-02-08 RX ORDER — OXYBUTYNIN CHLORIDE 5 MG/1
5 TABLET ORAL EVERY 6 HOURS PRN
Status: DISCONTINUED | OUTPATIENT
Start: 2023-02-08 | End: 2023-02-11

## 2023-02-08 RX ORDER — PROCHLORPERAZINE EDISYLATE 5 MG/ML
5 INJECTION INTRAMUSCULAR; INTRAVENOUS EVERY 8 HOURS PRN
Status: DISCONTINUED | OUTPATIENT
Start: 2023-02-08 | End: 2023-02-08 | Stop reason: HOSPADM

## 2023-02-08 RX ORDER — MIDAZOLAM HYDROCHLORIDE 1 MG/ML
INJECTION INTRAMUSCULAR; INTRAVENOUS AS NEEDED
Status: DISCONTINUED | OUTPATIENT
Start: 2023-02-08 | End: 2023-02-08 | Stop reason: SURG

## 2023-02-08 RX ORDER — NALOXONE HYDROCHLORIDE 0.4 MG/ML
80 INJECTION, SOLUTION INTRAMUSCULAR; INTRAVENOUS; SUBCUTANEOUS AS NEEDED
Status: DISCONTINUED | OUTPATIENT
Start: 2023-02-08 | End: 2023-02-08 | Stop reason: HOSPADM

## 2023-02-08 RX ORDER — SODIUM PHOSPHATE, DIBASIC AND SODIUM PHOSPHATE, MONOBASIC 7; 19 G/133ML; G/133ML
1 ENEMA RECTAL ONCE AS NEEDED
Status: DISCONTINUED | OUTPATIENT
Start: 2023-02-08 | End: 2023-02-11

## 2023-02-08 RX ORDER — HYDROMORPHONE HYDROCHLORIDE 1 MG/ML
0.2 INJECTION, SOLUTION INTRAMUSCULAR; INTRAVENOUS; SUBCUTANEOUS EVERY 5 MIN PRN
Status: DISCONTINUED | OUTPATIENT
Start: 2023-02-08 | End: 2023-02-08 | Stop reason: HOSPADM

## 2023-02-08 RX ORDER — MORPHINE SULFATE 2 MG/ML
2 INJECTION, SOLUTION INTRAMUSCULAR; INTRAVENOUS EVERY 2 HOUR PRN
Status: DISCONTINUED | OUTPATIENT
Start: 2023-02-08 | End: 2023-02-11

## 2023-02-08 RX ORDER — ACETAMINOPHEN 500 MG
1000 TABLET ORAL ONCE
Status: COMPLETED | OUTPATIENT
Start: 2023-02-08 | End: 2023-02-08

## 2023-02-08 RX ORDER — HYDROMORPHONE HYDROCHLORIDE 1 MG/ML
0.6 INJECTION, SOLUTION INTRAMUSCULAR; INTRAVENOUS; SUBCUTANEOUS EVERY 5 MIN PRN
Status: DISCONTINUED | OUTPATIENT
Start: 2023-02-08 | End: 2023-02-08 | Stop reason: HOSPADM

## 2023-02-08 RX ORDER — HEPARIN SODIUM 5000 [USP'U]/ML
5000 INJECTION, SOLUTION INTRAVENOUS; SUBCUTANEOUS EVERY 12 HOURS SCHEDULED
Status: DISCONTINUED | OUTPATIENT
Start: 2023-02-08 | End: 2023-02-11

## 2023-02-08 RX ORDER — HYDROMORPHONE HYDROCHLORIDE 1 MG/ML
0.4 INJECTION, SOLUTION INTRAMUSCULAR; INTRAVENOUS; SUBCUTANEOUS EVERY 5 MIN PRN
Status: DISCONTINUED | OUTPATIENT
Start: 2023-02-08 | End: 2023-02-08 | Stop reason: HOSPADM

## 2023-02-08 RX ORDER — ONDANSETRON 2 MG/ML
INJECTION INTRAMUSCULAR; INTRAVENOUS AS NEEDED
Status: DISCONTINUED | OUTPATIENT
Start: 2023-02-08 | End: 2023-02-08 | Stop reason: SURG

## 2023-02-08 RX ORDER — POLYETHYLENE GLYCOL 3350 17 G/17G
17 POWDER, FOR SOLUTION ORAL DAILY PRN
Status: DISCONTINUED | OUTPATIENT
Start: 2023-02-08 | End: 2023-02-11

## 2023-02-08 RX ORDER — MORPHINE SULFATE 4 MG/ML
4 INJECTION, SOLUTION INTRAMUSCULAR; INTRAVENOUS EVERY 10 MIN PRN
Status: DISCONTINUED | OUTPATIENT
Start: 2023-02-08 | End: 2023-02-08 | Stop reason: HOSPADM

## 2023-02-08 RX ORDER — SODIUM CHLORIDE, SODIUM LACTATE, POTASSIUM CHLORIDE, CALCIUM CHLORIDE 600; 310; 30; 20 MG/100ML; MG/100ML; MG/100ML; MG/100ML
INJECTION, SOLUTION INTRAVENOUS CONTINUOUS
Status: DISCONTINUED | OUTPATIENT
Start: 2023-02-08 | End: 2023-02-08 | Stop reason: HOSPADM

## 2023-02-08 RX ORDER — ONDANSETRON 2 MG/ML
4 INJECTION INTRAMUSCULAR; INTRAVENOUS EVERY 6 HOURS PRN
Status: DISCONTINUED | OUTPATIENT
Start: 2023-02-08 | End: 2023-02-08 | Stop reason: HOSPADM

## 2023-02-08 RX ORDER — DEXAMETHASONE SODIUM PHOSPHATE 4 MG/ML
VIAL (ML) INJECTION AS NEEDED
Status: DISCONTINUED | OUTPATIENT
Start: 2023-02-08 | End: 2023-02-08 | Stop reason: SURG

## 2023-02-08 RX ORDER — MORPHINE SULFATE 10 MG/ML
6 INJECTION, SOLUTION INTRAMUSCULAR; INTRAVENOUS EVERY 10 MIN PRN
Status: DISCONTINUED | OUTPATIENT
Start: 2023-02-08 | End: 2023-02-08 | Stop reason: HOSPADM

## 2023-02-08 RX ORDER — SODIUM CHLORIDE 9 MG/ML
INJECTION, SOLUTION INTRAVENOUS CONTINUOUS
Status: DISCONTINUED | OUTPATIENT
Start: 2023-02-08 | End: 2023-02-10

## 2023-02-08 RX ORDER — SENNOSIDES 8.6 MG
17.2 TABLET ORAL NIGHTLY PRN
Status: DISCONTINUED | OUTPATIENT
Start: 2023-02-08 | End: 2023-02-11

## 2023-02-08 RX ORDER — MORPHINE SULFATE 4 MG/ML
2 INJECTION, SOLUTION INTRAMUSCULAR; INTRAVENOUS EVERY 10 MIN PRN
Status: DISCONTINUED | OUTPATIENT
Start: 2023-02-08 | End: 2023-02-08 | Stop reason: HOSPADM

## 2023-02-08 RX ADMIN — DEXAMETHASONE SODIUM PHOSPHATE 4 MG: 4 MG/ML VIAL (ML) INJECTION at 14:03:00

## 2023-02-08 RX ADMIN — ONDANSETRON 4 MG: 2 INJECTION INTRAMUSCULAR; INTRAVENOUS at 14:03:00

## 2023-02-08 RX ADMIN — MIDAZOLAM HYDROCHLORIDE 2 MG: 1 INJECTION INTRAMUSCULAR; INTRAVENOUS at 13:55:00

## 2023-02-08 NOTE — OPERATIVE REPORT
[facility]    Operative Note     [facility] Location: [facility] [id number] MRN [id number]   Admission Date 2/8/2023 Operation Date 2/8/2023   Attending Physician Keyla Waller MD Operating Physician Guillermo Linares MD      Preoperative Diagnosis: BPH with obstruction/lower urinary tract symptoms [N40.1, N13.8]   , Urinary retention  Postoperative Diagnosis: BPH with obstruction/lower urinary tract symptoms [N40.1, N13.8]   , Urinary retention  Procedure Performed:   Cystoscopy, bipolar transurethral resection of the prostate     Primary Surgeon: Guillermo Linares MD      Assistant: None     Surgical Findings: Trilobar very large prostate, 100 g or more in size. Anesthesia: General     Complications: None     Implants: * No implants in log *     Specimen: TUR chips submitted for pathology     Drains: 25 Israeli three-way Campa catheter with a 30 cc balloon on traction     Condition: Stable     Estimated Blood Loss: No data recorded     Summary of Case: After consent was obtained preoperative antibiotics administered the patient was brought into the operating room. Anesthesia was administered and he was placed in the dorsolithotomy position. The groin was prepped and draped in the usual sterile standard fashion. A 24 Israeli continuous-flow resectoscope with a Greenfield bipolar working element was placed per bladder and urethra. Findings are reported in the findings section of this report. I performed resection of the lateral lobes first on the right and the left side and then finally the median lobe. Intermittently, hemostasis was being worked on when large bleeders were detected. The ureter orifice on both sides was noted in terms of location and appearance throughout the procedure intermittently. TUR chips were removed using an [employer] evacuator. After the resection process was completed the TUR chips were collected and submitted for pathology.   I placed a 22 [place] Grace three-way Campa catheter with a 30 cc balloon. The catheter was placed on traction. The patient was awakened extubated and taken out to the postanesthesia care unit in stable condition. I was present and performed the entirety of this procedure. There were no perioperative or immediate postoperative complications.      Teena Joshi MD  2/8/2023  2:59 PM

## 2023-02-08 NOTE — ANESTHESIA PROCEDURE NOTES
Airway  Date/Time: 2/8/2023 1:59 PM  Urgency: Elective      General Information and Staff    Patient location during procedure: OR  Anesthesiologist: Javier Sky MD  Performed: anesthesiologist     Indications and Patient Condition  Indications for airway management: anesthesia  Sedation level: deep  Preoxygenated: yes  Patient position: sniffing  Mask difficulty assessment: 1 - vent by mask    Final Airway Details  Final airway type: supraglottic airway      Successful airway: classic  Size 5       Number of attempts at approach: 1

## 2023-02-08 NOTE — INTERVAL H&P NOTE
Pre-op Diagnosis: BPH with obstruction/lower urinary tract symptoms [N40.1, N13.8]    The above referenced H&P was reviewed by Giana Hollingsworth MD on 2/8/2023, the patient was examined and no significant changes have occurred in the patient's condition since the H&P was performed. I discussed with the patient and/or legal representative the potential benefits, risks and side effects of this procedure; the likelihood of the patient achieving goals; and potential problems that might occur during recuperation. I discussed reasonable alternatives to the procedure, including risks, benefits and side effects related to the alternatives and risks related to not receiving this procedure. We will proceed with procedure as planned.

## 2023-02-09 ENCOUNTER — ANESTHESIA (OUTPATIENT)
Dept: SURGERY | Facility: HOSPITAL | Age: 63
End: 2023-02-09
Payer: COMMERCIAL

## 2023-02-09 ENCOUNTER — ANESTHESIA EVENT (OUTPATIENT)
Dept: SURGERY | Facility: HOSPITAL | Age: 63
End: 2023-02-09
Payer: COMMERCIAL

## 2023-02-09 PROBLEM — Z01.818 PREOP TESTING: Status: ACTIVE | Noted: 2023-02-09

## 2023-02-09 LAB
ANION GAP SERPL CALC-SCNC: 5 MMOL/L (ref 0–18)
BUN BLD-MCNC: 17 MG/DL (ref 7–18)
BUN/CREAT SERPL: 13.9 (ref 10–20)
CALCIUM BLD-MCNC: 8 MG/DL (ref 8.5–10.1)
CHLORIDE SERPL-SCNC: 112 MMOL/L (ref 98–112)
CO2 SERPL-SCNC: 26 MMOL/L (ref 21–32)
CREAT BLD-MCNC: 1.22 MG/DL
DEPRECATED RDW RBC AUTO: 43.2 FL (ref 35.1–46.3)
ERYTHROCYTE [DISTWIDTH] IN BLOOD BY AUTOMATED COUNT: 12.8 % (ref 11–15)
GFR SERPLBLD BASED ON 1.73 SQ M-ARVRAT: 67 ML/MIN/1.73M2 (ref 60–?)
GLUCOSE BLD-MCNC: 143 MG/DL (ref 70–99)
GLUCOSE BLDC GLUCOMTR-MCNC: 133 MG/DL (ref 70–99)
HCT VFR BLD AUTO: 25 %
HCT VFR BLD AUTO: 27.9 %
HCT VFR BLD AUTO: 29 %
HGB BLD-MCNC: 8.3 G/DL
HGB BLD-MCNC: 9.2 G/DL
HGB BLD-MCNC: 9.6 G/DL
IRON SATN MFR SERPL: 12 %
IRON SERPL-MCNC: 41 UG/DL
LACTATE SERPL-SCNC: 1.9 MMOL/L (ref 0.4–2)
MCH RBC QN AUTO: 30.5 PG (ref 26–34)
MCHC RBC AUTO-ENTMCNC: 33 G/DL (ref 31–37)
MCV RBC AUTO: 92.4 FL
OSMOLALITY SERPL CALC.SUM OF ELEC: 300 MOSM/KG (ref 275–295)
PLATELET # BLD AUTO: 194 10(3)UL (ref 150–450)
POTASSIUM SERPL-SCNC: 4.8 MMOL/L (ref 3.5–5.1)
PROCALCITONIN SERPL-MCNC: 0.05 NG/ML (ref ?–0.16)
RBC # BLD AUTO: 3.02 X10(6)UL
SODIUM SERPL-SCNC: 143 MMOL/L (ref 136–145)
TIBC SERPL-MCNC: 337 UG/DL (ref 240–450)
TRANSFERRIN SERPL-MCNC: 226 MG/DL (ref 200–360)
WBC # BLD AUTO: 16.6 X10(3) UL (ref 4–11)

## 2023-02-09 PROCEDURE — 99233 SBSQ HOSP IP/OBS HIGH 50: CPT | Performed by: HOSPITALIST

## 2023-02-09 PROCEDURE — 0TCC8ZZ EXTIRPATION OF MATTER FROM BLADDER NECK, VIA NATURAL OR ARTIFICIAL OPENING ENDOSCOPIC: ICD-10-PCS | Performed by: UROLOGY

## 2023-02-09 RX ORDER — HYDROMORPHONE HYDROCHLORIDE 1 MG/ML
0.2 INJECTION, SOLUTION INTRAMUSCULAR; INTRAVENOUS; SUBCUTANEOUS EVERY 5 MIN PRN
Status: DISCONTINUED | OUTPATIENT
Start: 2023-02-09 | End: 2023-02-09 | Stop reason: HOSPADM

## 2023-02-09 RX ORDER — EPHEDRINE SULFATE 50 MG/ML
INJECTION INTRAVENOUS AS NEEDED
Status: DISCONTINUED | OUTPATIENT
Start: 2023-02-09 | End: 2023-02-09 | Stop reason: SURG

## 2023-02-09 RX ORDER — SODIUM CHLORIDE, SODIUM LACTATE, POTASSIUM CHLORIDE, CALCIUM CHLORIDE 600; 310; 30; 20 MG/100ML; MG/100ML; MG/100ML; MG/100ML
INJECTION, SOLUTION INTRAVENOUS CONTINUOUS PRN
Status: DISCONTINUED | OUTPATIENT
Start: 2023-02-09 | End: 2023-02-09 | Stop reason: SURG

## 2023-02-09 RX ORDER — LIDOCAINE HYDROCHLORIDE 10 MG/ML
INJECTION, SOLUTION EPIDURAL; INFILTRATION; INTRACAUDAL; PERINEURAL AS NEEDED
Status: DISCONTINUED | OUTPATIENT
Start: 2023-02-09 | End: 2023-02-09 | Stop reason: SURG

## 2023-02-09 RX ORDER — ONDANSETRON 2 MG/ML
INJECTION INTRAMUSCULAR; INTRAVENOUS AS NEEDED
Status: DISCONTINUED | OUTPATIENT
Start: 2023-02-09 | End: 2023-02-09 | Stop reason: SURG

## 2023-02-09 RX ORDER — DEXAMETHASONE SODIUM PHOSPHATE 4 MG/ML
VIAL (ML) INJECTION AS NEEDED
Status: DISCONTINUED | OUTPATIENT
Start: 2023-02-09 | End: 2023-02-09 | Stop reason: SURG

## 2023-02-09 RX ORDER — MORPHINE SULFATE 4 MG/ML
2 INJECTION, SOLUTION INTRAMUSCULAR; INTRAVENOUS EVERY 10 MIN PRN
Status: DISCONTINUED | OUTPATIENT
Start: 2023-02-09 | End: 2023-02-09 | Stop reason: HOSPADM

## 2023-02-09 RX ORDER — NALOXONE HYDROCHLORIDE 0.4 MG/ML
80 INJECTION, SOLUTION INTRAMUSCULAR; INTRAVENOUS; SUBCUTANEOUS AS NEEDED
Status: DISCONTINUED | OUTPATIENT
Start: 2023-02-09 | End: 2023-02-09 | Stop reason: HOSPADM

## 2023-02-09 RX ORDER — SODIUM CHLORIDE, SODIUM LACTATE, POTASSIUM CHLORIDE, CALCIUM CHLORIDE 600; 310; 30; 20 MG/100ML; MG/100ML; MG/100ML; MG/100ML
INJECTION, SOLUTION INTRAVENOUS CONTINUOUS
Status: DISCONTINUED | OUTPATIENT
Start: 2023-02-09 | End: 2023-02-09 | Stop reason: HOSPADM

## 2023-02-09 RX ORDER — PROCHLORPERAZINE EDISYLATE 5 MG/ML
5 INJECTION INTRAMUSCULAR; INTRAVENOUS EVERY 8 HOURS PRN
Status: DISCONTINUED | OUTPATIENT
Start: 2023-02-09 | End: 2023-02-09 | Stop reason: HOSPADM

## 2023-02-09 RX ORDER — HYDROMORPHONE HYDROCHLORIDE 1 MG/ML
0.6 INJECTION, SOLUTION INTRAMUSCULAR; INTRAVENOUS; SUBCUTANEOUS EVERY 5 MIN PRN
Status: DISCONTINUED | OUTPATIENT
Start: 2023-02-09 | End: 2023-02-09 | Stop reason: HOSPADM

## 2023-02-09 RX ORDER — ONDANSETRON 2 MG/ML
4 INJECTION INTRAMUSCULAR; INTRAVENOUS EVERY 6 HOURS PRN
Status: DISCONTINUED | OUTPATIENT
Start: 2023-02-09 | End: 2023-02-09 | Stop reason: HOSPADM

## 2023-02-09 RX ORDER — SODIUM CHLORIDE 9 MG/ML
INJECTION, SOLUTION INTRAVENOUS CONTINUOUS
Status: DISCONTINUED | OUTPATIENT
Start: 2023-02-09 | End: 2023-02-10

## 2023-02-09 RX ORDER — HYDROCODONE BITARTRATE AND ACETAMINOPHEN 5; 325 MG/1; MG/1
1 TABLET ORAL EVERY 6 HOURS PRN
Status: DISCONTINUED | OUTPATIENT
Start: 2023-02-09 | End: 2023-02-11

## 2023-02-09 RX ORDER — MORPHINE SULFATE 10 MG/ML
6 INJECTION, SOLUTION INTRAMUSCULAR; INTRAVENOUS EVERY 10 MIN PRN
Status: DISCONTINUED | OUTPATIENT
Start: 2023-02-09 | End: 2023-02-09 | Stop reason: HOSPADM

## 2023-02-09 RX ORDER — MORPHINE SULFATE 4 MG/ML
4 INJECTION, SOLUTION INTRAMUSCULAR; INTRAVENOUS EVERY 10 MIN PRN
Status: DISCONTINUED | OUTPATIENT
Start: 2023-02-09 | End: 2023-02-09 | Stop reason: HOSPADM

## 2023-02-09 RX ORDER — HYDROMORPHONE HYDROCHLORIDE 1 MG/ML
0.4 INJECTION, SOLUTION INTRAMUSCULAR; INTRAVENOUS; SUBCUTANEOUS EVERY 5 MIN PRN
Status: DISCONTINUED | OUTPATIENT
Start: 2023-02-09 | End: 2023-02-09 | Stop reason: HOSPADM

## 2023-02-09 RX ADMIN — SODIUM CHLORIDE, SODIUM LACTATE, POTASSIUM CHLORIDE, CALCIUM CHLORIDE: 600; 310; 30; 20 INJECTION, SOLUTION INTRAVENOUS at 02:38:00

## 2023-02-09 RX ADMIN — LIDOCAINE HYDROCHLORIDE 50 MG: 10 INJECTION, SOLUTION EPIDURAL; INFILTRATION; INTRACAUDAL; PERINEURAL at 02:04:00

## 2023-02-09 RX ADMIN — ONDANSETRON 4 MG: 2 INJECTION INTRAMUSCULAR; INTRAVENOUS at 02:33:00

## 2023-02-09 RX ADMIN — SODIUM CHLORIDE, SODIUM LACTATE, POTASSIUM CHLORIDE, CALCIUM CHLORIDE: 600; 310; 30; 20 INJECTION, SOLUTION INTRAVENOUS at 02:00:00

## 2023-02-09 RX ADMIN — EPHEDRINE SULFATE 15 MG: 50 INJECTION INTRAVENOUS at 02:20:00

## 2023-02-09 RX ADMIN — EPHEDRINE SULFATE 20 MG: 50 INJECTION INTRAVENOUS at 02:26:00

## 2023-02-09 RX ADMIN — EPHEDRINE SULFATE 15 MG: 50 INJECTION INTRAVENOUS at 02:09:00

## 2023-02-09 RX ADMIN — DEXAMETHASONE SODIUM PHOSPHATE 4 MG: 4 MG/ML VIAL (ML) INJECTION at 02:04:00

## 2023-02-09 NOTE — SIGNIFICANT EVENT
S  Overnight CBI catheter stopped draining and RN was unable to flush it. Prior to that it was draining bloody urine. Dr. Sunil Pavon ordered a stat hemoglobin at about 9 pm which was stable when compared with the 330 pm draw. Patient had a transient drop in BP which responded to fluid bolus and Dr. Sunil Pavon took him to the OR for evacuation of clots and fulguration. Post operatively his bp was low. Again this responded to fluid bolus. Repeat hemoglobin dropped to 9.2. And WBC was 16.6. He received 1500 ml fluid bolus in OR/PACU with return of bp to 630 systolic. Currently patient is in PCU with CBIs draining clear yellow urine. Patient denies any abdominal pain chest pain dizzines sob or nausea. O  T 96.7  P 100 R 13  /68  O2 sat 100% on 2l nc. Awake alert comfortable in no distress  HEENT NC AT pupils equal   Neck supple no JVD  Lungs clear with easy respirations  Cor slightly tachy RRR  Abdomen soft BS decreased. Non tender  Ext no cce  CBIs running urine clear yellow. Neuro  A and O times 4 no focal deficit. Post op CBC   Wbc 16.6  hgb 9.2  plt 194K  Urine culture from 2/5 enterobacter and enterococcus not VRE    A/P  1. Post turp  POD1  Patient had bleeding requiring cysto with evacuation of clots and fulgartion today POD0  Received 2.5 liter bolus and now has normal bp but still slightly tachycardic will check lactic acid level, change antibiotics to meropenem check blood cultures. Fluids at 150 ml/hr pending lactic acid results. Follow serial hemoglobin as well. 2.  Elevated WBC could reflect stress and bleeding but may also be indicitive of infection as well. Will therefore broaden antibiotic coverage. 3.  Mild hypoxemia check cxr. Patient was transferred to PCU for closer monitoring.

## 2023-02-09 NOTE — PROGRESS NOTES
Called by nurse at 12:30 that catheter/CBI no longer draining, unable to flush. I had stopped by at 9 PM and irrigated catheter with return of cherry red aspirate but no clots. He was still requiring open CBI rate with cherry color return. He complained of bladder pain although he denies this today. While he was being irrigated, complained of some dizziness and was noted to have low SBP of 86/61. He states the dizziness has resolved. Dr. Xander Rosario has ordered a bolus which he is getting now. Reviewed findings with pt./  Recommended cysto under anesthesia, clot evacuation and possible fulguration. Risks and possible side effects reviewed and he understands and agrees.

## 2023-02-09 NOTE — ANESTHESIA PROCEDURE NOTES
Airway  Date/Time: 2/9/2023 2:05 AM  Urgency: Elective      General Information and Staff    Patient location during procedure: OR  Anesthesiologist: Livia Sethi MD  Performed: anesthesiologist     Indications and Patient Condition  Indications for airway management: anesthesia  Sedation level: deep  Preoxygenated: yes  Patient position: sniffing  Mask difficulty assessment: 0 - not attempted    Final Airway Details  Final airway type: supraglottic airway      Successful airway: classic  Size 4       Number of attempts at approach: 1

## 2023-02-09 NOTE — OPERATIVE REPORT
[facility]    Operative Note     [facility] Location: [facility] [id number] MRN [id number]   Admission Date 2/8/2023 Operation Date 2/9/2023   Attending Physician Janine Ibarra MD Operating Physician Nikos Ram MD      Preoperative Diagnosis: Clot obstruction, hematuria [R31.0]     Postoperative Diagnosis: Clot obstruction, hematuria [R31.0]     Procedure Performed:   [place] EVACUATION  [address]     Primary Surgeon: Nikos Ram MD      Assistant: None     Surgical Findings: Evidence of bleeding at the 5 and 7:00 positions in the bladder neck previous area of resection. Copious amounts of clots within the bladder which were evacuated using an Ellik evacuator. Anesthesia: General     Complications: None     Implants: * No implants in log *     Specimen: None     Drains: 24 Mongolian three-way Campa catheter with a 30 cc balloon     Condition: Stable     Estimated Blood Loss: Blood Output: 0 mL (2/8/2023  5:25 PM)       Summary of Case: After consent was obtained and preoperative antibiotics which had been administered yesterday at 2 PM (Rocephin) the patient was brought into the operating room. Anesthesia was administered and he was placed in the dorsolithotomy position. The groin was prepped and draped in the usual sterile standard fashion. 24 Mongolian continuous-flow resectoscope was placed per urethra. Findings were reported in the findings section of this report. An Ellik evacuator was used to remove copious amounts of clots. At least 500 cc of clot material was found in the bladder. After this was removed I looked at the prostatic urethral bed, area of previous resection. There was areas of bleeding at the 5 and 7:00 positions. Those were cauterized using a bipolar loop on coagulation mode. No additional areas of bleeding could be seen. I reinspected the bladder and did not see any additional clots. Resectoscope was removed.   I then placed a 24 [place] three-way Campa catheter with a 30 cc balloon. The patient was started on continuous bladder irrigation. He was placed in the supine position awakened extubated and taken out to the postanesthesia care in stable condition. I was present and performed the entirety of this procedure. There were no perioperative or immediate postop complications.      Bertha Gómez MD  2/9/2023  2:45 AM

## 2023-02-10 LAB
ANION GAP SERPL CALC-SCNC: 2 MMOL/L (ref 0–18)
BUN BLD-MCNC: 14 MG/DL (ref 7–18)
BUN/CREAT SERPL: 13.5 (ref 10–20)
CALCIUM BLD-MCNC: 7.9 MG/DL (ref 8.5–10.1)
CHLORIDE SERPL-SCNC: 115 MMOL/L (ref 98–112)
CO2 SERPL-SCNC: 28 MMOL/L (ref 21–32)
CREAT BLD-MCNC: 1.04 MG/DL
DEPRECATED RDW RBC AUTO: 44.3 FL (ref 35.1–46.3)
ERYTHROCYTE [DISTWIDTH] IN BLOOD BY AUTOMATED COUNT: 13.2 % (ref 11–15)
GFR SERPLBLD BASED ON 1.73 SQ M-ARVRAT: 82 ML/MIN/1.73M2 (ref 60–?)
GLUCOSE BLD-MCNC: 102 MG/DL (ref 70–99)
HCT VFR BLD AUTO: 24.4 %
HGB BLD-MCNC: 8.1 G/DL
MCH RBC QN AUTO: 30.8 PG (ref 26–34)
MCHC RBC AUTO-ENTMCNC: 33.2 G/DL (ref 31–37)
MCV RBC AUTO: 92.8 FL
OSMOLALITY SERPL CALC.SUM OF ELEC: 301 MOSM/KG (ref 275–295)
PLATELET # BLD AUTO: 177 10(3)UL (ref 150–450)
POTASSIUM SERPL-SCNC: 4.1 MMOL/L (ref 3.5–5.1)
RBC # BLD AUTO: 2.63 X10(6)UL
SODIUM SERPL-SCNC: 145 MMOL/L (ref 136–145)
WBC # BLD AUTO: 8.9 X10(3) UL (ref 4–11)

## 2023-02-10 PROCEDURE — 99239 HOSP IP/OBS DSCHRG MGMT >30: CPT | Performed by: HOSPITALIST

## 2023-02-10 RX ORDER — MELATONIN
325
Qty: 30 TABLET | Refills: 0 | Status: SHIPPED | OUTPATIENT
Start: 2023-02-10 | End: 2023-03-12

## 2023-02-10 RX ORDER — SULFAMETHOXAZOLE AND TRIMETHOPRIM 800; 160 MG/1; MG/1
1 TABLET ORAL 2 TIMES DAILY
Qty: 6 TABLET | Refills: 0 | Status: SHIPPED | OUTPATIENT
Start: 2023-02-10 | End: 2023-02-11

## 2023-02-10 RX ORDER — HYDROCODONE BITARTRATE AND ACETAMINOPHEN 5; 325 MG/1; MG/1
1 TABLET ORAL EVERY 6 HOURS PRN
Qty: 20 TABLET | Refills: 0 | Status: SHIPPED | OUTPATIENT
Start: 2023-02-10

## 2023-02-10 NOTE — DISCHARGE INSTRUCTIONS
A (BABADU Emil Blue J-tip 190cm) guidewire was introduced. Into LAD   FU with PCP as needed. FU with Dr Felicia Jacinto as directed.    Campa care

## 2023-02-11 VITALS
OXYGEN SATURATION: 99 % | HEIGHT: 67 IN | SYSTOLIC BLOOD PRESSURE: 117 MMHG | BODY MASS INDEX: 29.48 KG/M2 | HEART RATE: 86 BPM | TEMPERATURE: 99 F | DIASTOLIC BLOOD PRESSURE: 64 MMHG | WEIGHT: 187.81 LBS | RESPIRATION RATE: 16 BRPM

## 2023-02-11 LAB
ANION GAP SERPL CALC-SCNC: 2 MMOL/L (ref 0–18)
BUN BLD-MCNC: 14 MG/DL (ref 7–18)
BUN/CREAT SERPL: 13.5 (ref 10–20)
CALCIUM BLD-MCNC: 8 MG/DL (ref 8.5–10.1)
CHLORIDE SERPL-SCNC: 112 MMOL/L (ref 98–112)
CO2 SERPL-SCNC: 29 MMOL/L (ref 21–32)
CREAT BLD-MCNC: 1.04 MG/DL
DEPRECATED RDW RBC AUTO: 44.7 FL (ref 35.1–46.3)
ERYTHROCYTE [DISTWIDTH] IN BLOOD BY AUTOMATED COUNT: 13.1 % (ref 11–15)
GFR SERPLBLD BASED ON 1.73 SQ M-ARVRAT: 82 ML/MIN/1.73M2 (ref 60–?)
GLUCOSE BLD-MCNC: 103 MG/DL (ref 70–99)
HCT VFR BLD AUTO: 25.8 %
HGB BLD-MCNC: 8.5 G/DL
MCH RBC QN AUTO: 30.7 PG (ref 26–34)
MCHC RBC AUTO-ENTMCNC: 32.9 G/DL (ref 31–37)
MCV RBC AUTO: 93.1 FL
OSMOLALITY SERPL CALC.SUM OF ELEC: 297 MOSM/KG (ref 275–295)
PLATELET # BLD AUTO: 191 10(3)UL (ref 150–450)
POTASSIUM SERPL-SCNC: 4 MMOL/L (ref 3.5–5.1)
RBC # BLD AUTO: 2.77 X10(6)UL
SODIUM SERPL-SCNC: 143 MMOL/L (ref 136–145)
WBC # BLD AUTO: 10.3 X10(3) UL (ref 4–11)

## 2023-02-11 PROCEDURE — 99232 SBSQ HOSP IP/OBS MODERATE 35: CPT | Performed by: HOSPITALIST

## 2023-02-13 ENCOUNTER — PATIENT OUTREACH (OUTPATIENT)
Dept: CASE MANAGEMENT | Age: 63
End: 2023-02-13

## 2023-02-15 ENCOUNTER — TELEPHONE (OUTPATIENT)
Dept: SURGERY | Facility: CLINIC | Age: 63
End: 2023-02-15

## 2023-02-15 NOTE — TELEPHONE ENCOUNTER
Please advise when patient should be seen. Copy of op notes below. Assessment and Plan:     [facility] loc w urin obs/LUTS  -CBI clamping trial this morning. If stable by 1 PM may go home with followup next week tuesday for voiding trial in the office.  -He will continue preop prescription for Cipro. -Nurse will provide leg and large bag.  -My office will arrange followup.  -Discussed with pt and nurse. Will provide work release letter.

## 2023-02-16 NOTE — TELEPHONE ENCOUNTER
I would like to see him next week Monday for voiding trial in the morning. Please double book him for appointment.

## 2023-02-16 NOTE — TELEPHONE ENCOUNTER
Pt came to the . Asking for a work note. Informed Pt of  Dr Devorah Velazquez message and made him a appointment for  Monday 2/20/23. Pt agreed.

## 2023-02-20 ENCOUNTER — TELEPHONE (OUTPATIENT)
Dept: SURGERY | Facility: CLINIC | Age: 63
End: 2023-02-20

## 2023-02-20 ENCOUNTER — OFFICE VISIT (OUTPATIENT)
Dept: SURGERY | Facility: CLINIC | Age: 63
End: 2023-02-20

## 2023-02-20 DIAGNOSIS — N40.1 BPH WITH OBSTRUCTION/LOWER URINARY TRACT SYMPTOMS: Primary | ICD-10-CM

## 2023-02-20 DIAGNOSIS — N13.8 BPH WITH OBSTRUCTION/LOWER URINARY TRACT SYMPTOMS: Primary | ICD-10-CM

## 2023-02-20 DIAGNOSIS — R33.9 URINARY RETENTION: ICD-10-CM

## 2023-02-20 PROCEDURE — 99024 POSTOP FOLLOW-UP VISIT: CPT | Performed by: UROLOGY

## 2023-02-20 NOTE — TELEPHONE ENCOUNTER
Pt is at  asking for a note stating that  He was here today and also a  Return to work  note . Pt wants to return  2/27/23 .  Pt is waiting

## 2023-02-20 NOTE — PROGRESS NOTES
-I was ordered by MAURILIO to complete Voiding Trial; used  Amirah/ #[id number] to explain procedure & verify pt identity.  -Positions self on exam table; draped for privacy to lower sweats to mid-thigh; instill 200ml sterile NS thru wyman when pt had urge to void; aspirate 9ml from balloon & slowly remove catheter; pt voided 100ml; pt redressed self. -Instructed to contact [facility]-Center ([phone]) to be connected to Urology & report output since leaving clinic this am.  -NOV with MAURILIO 3/20/23 for bladder scan.  -Encounter complete.

## 2023-02-20 NOTE — TELEPHONE ENCOUNTER
Per pt is unable to get paid until a note is given stating he has been under Dr. Traci Lara care since 2/8/23, please fax to [address] attn: Nazanin MEYER#[phone]. Thank you.

## 2023-02-20 NOTE — TELEPHONE ENCOUNTER
Asking note to be updated so he can get disability -it needs to be added to note to state that pt has been under Drs care since 2/8 -please fax to [phone]

## 2023-02-20 NOTE — TELEPHONE ENCOUNTER
Per Leora Giles, patient was given letter when he came in the office today. Will send letter to fax # below.

## 2023-03-01 ENCOUNTER — HOSPITAL ENCOUNTER (EMERGENCY)
Facility: HOSPITAL | Age: 63
Discharge: HOME | End: 2023-03-01
Attending: EMERGENCY MEDICINE
Payer: COMMERCIAL

## 2023-03-01 ENCOUNTER — TELEPHONE (OUTPATIENT)
Dept: SURGERY | Facility: CLINIC | Age: 63
End: 2023-03-01

## 2023-03-01 VITALS
WEIGHT: 194 LBS | RESPIRATION RATE: 17 BRPM | HEART RATE: 78 BPM | DIASTOLIC BLOOD PRESSURE: 88 MMHG | OXYGEN SATURATION: 94 % | TEMPERATURE: 98 F | BODY MASS INDEX: 30 KG/M2 | SYSTOLIC BLOOD PRESSURE: 122 MMHG

## 2023-03-01 DIAGNOSIS — R31.9 HEMATURIA, UNSPECIFIED TYPE: Primary | ICD-10-CM

## 2023-03-01 LAB
ANION GAP SERPL CALC-SCNC: 5 MMOL/L (ref 0–18)
BASOPHILS # BLD AUTO: 0.04 X10(3) UL (ref 0–0.2)
BASOPHILS NFR BLD AUTO: 0.6 %
BILIRUB UR QL: NEGATIVE
BUN BLD-MCNC: 14 MG/DL (ref 7–18)
BUN/CREAT SERPL: 13 (ref 10–20)
CALCIUM BLD-MCNC: 9.1 MG/DL (ref 8.5–10.1)
CHLORIDE SERPL-SCNC: 108 MMOL/L (ref 98–112)
CO2 SERPL-SCNC: 24 MMOL/L (ref 21–32)
COLOR UR: YELLOW
CREAT BLD-MCNC: 1.08 MG/DL
DEPRECATED RDW RBC AUTO: 45.9 FL (ref 35.1–46.3)
EOSINOPHIL # BLD AUTO: 0.09 X10(3) UL (ref 0–0.7)
EOSINOPHIL NFR BLD AUTO: 1.2 %
ERYTHROCYTE [DISTWIDTH] IN BLOOD BY AUTOMATED COUNT: 13.4 % (ref 11–15)
GFR SERPLBLD BASED ON 1.73 SQ M-ARVRAT: 78 ML/MIN/1.73M2 (ref 60–?)
GLUCOSE BLD-MCNC: 103 MG/DL (ref 70–99)
GLUCOSE UR-MCNC: NEGATIVE MG/DL
HCT VFR BLD AUTO: 33.2 %
HGB BLD-MCNC: 10.9 G/DL
IMM GRANULOCYTES # BLD AUTO: 0.01 X10(3) UL (ref 0–1)
IMM GRANULOCYTES NFR BLD: 0.1 %
KETONES UR-MCNC: NEGATIVE MG/DL
LYMPHOCYTES # BLD AUTO: 1.53 X10(3) UL (ref 1–4)
LYMPHOCYTES NFR BLD AUTO: 21.2 %
MCH RBC QN AUTO: 30.5 PG (ref 26–34)
MCHC RBC AUTO-ENTMCNC: 32.8 G/DL (ref 31–37)
MCV RBC AUTO: 93 FL
MONOCYTES # BLD AUTO: 0.32 X10(3) UL (ref 0.1–1)
MONOCYTES NFR BLD AUTO: 4.4 %
NEUTROPHILS # BLD AUTO: 5.22 X10 (3) UL (ref 1.5–7.7)
NEUTROPHILS # BLD AUTO: 5.22 X10(3) UL (ref 1.5–7.7)
NEUTROPHILS NFR BLD AUTO: 72.5 %
NITRITE UR QL STRIP.AUTO: NEGATIVE
OSMOLALITY SERPL CALC.SUM OF ELEC: 285 MOSM/KG (ref 275–295)
PH UR: 5 [PH] (ref 5–8)
PLATELET # BLD AUTO: 325 10(3)UL (ref 150–450)
POTASSIUM SERPL-SCNC: 3.9 MMOL/L (ref 3.5–5.1)
PROT UR-MCNC: 30 MG/DL
RBC # BLD AUTO: 3.57 X10(6)UL
RBC #/AREA URNS AUTO: >10 /HPF
SODIUM SERPL-SCNC: 137 MMOL/L (ref 136–145)
SP GR UR STRIP: 1.01 (ref 1–1.03)
UROBILINOGEN UR STRIP-ACNC: <2
VIT C UR-MCNC: NEGATIVE MG/DL
WBC # BLD AUTO: 7.2 X10(3) UL (ref 4–11)
WBC #/AREA URNS AUTO: >50 /HPF

## 2023-03-01 PROCEDURE — 99285 EMERGENCY DEPT VISIT HI MDM: CPT

## 2023-03-01 PROCEDURE — 80048 BASIC METABOLIC PNL TOTAL CA: CPT | Performed by: EMERGENCY MEDICINE

## 2023-03-01 PROCEDURE — 87086 URINE CULTURE/COLONY COUNT: CPT | Performed by: EMERGENCY MEDICINE

## 2023-03-01 PROCEDURE — 36415 COLL VENOUS BLD VENIPUNCTURE: CPT

## 2023-03-01 PROCEDURE — 85025 COMPLETE CBC W/AUTO DIFF WBC: CPT | Performed by: EMERGENCY MEDICINE

## 2023-03-01 PROCEDURE — 99283 EMERGENCY DEPT VISIT LOW MDM: CPT

## 2023-03-01 PROCEDURE — 81001 URINALYSIS AUTO W/SCOPE: CPT | Performed by: EMERGENCY MEDICINE

## 2023-03-01 NOTE — ED QUICK NOTES
Rounding Completed    Plan of Care reviewed. Waiting for results. Elimination needs assessed. Provided update on poc. Bed is locked and in lowest position. Call light within reach.

## 2023-03-01 NOTE — ED INITIAL ASSESSMENT (HPI)
Patient sent by Dr. Paradise Meier office for gross hematuria with blood clots. States he had a prostate surgery on feb 8th. Admits to pain when urinating.

## 2023-03-01 NOTE — TELEPHONE ENCOUNTER
I called pt into the exam room and used the language IPad with Iranian interpretor Meliton , ID # [id number] and I introduced myself and I verified the pt's name and  and I determined that starting on Monday pt noted that his urine was dark red bloody with medium sized blood clots through out the stream. He at times had some difficulty pushing the clots out. Pt had a recent TURP on 23 and then on  had to return to the ER for gross hematuria and had a cysto, clot evacuation with fulguration. I told pt that I think he needs to go to the ER so that they can place a cath and do CBI. I explained that KHCRISTEL is not in the office today and in surgery all day. Pt agreed to go to the ER. I also determined that he has been struggling with severe constipation (hard tarry stools) and determined that he was taking an Iron prep and Norco after surgery which most likely caused this. I told him that he needs to take 2 oz of MOM and if no results after 6 hrs to take another dose. Pt denied fever and chills, is not using any aspirin products and drinks about 4 bottles of water daily. Also denied that he did any heavy lifting or strenuous physical activity. He states he just went back to work yesterday but is not doing anything strenuous. He asked for a work excuse note for  through  and also for today since he will be going to the ER. I told pt that since [place] is not here today I will have to ask for permission to generate the letters and I will call him about that. He gave me his employers info at [address], [phone], Nazanin in [employer]. He asked that I call him at [phone] or [phone]. Tasked to [place], please advise.

## 2023-03-01 NOTE — TELEPHONE ENCOUNTER
Pt is at the  asking to speak with Dr Aj Meza. Told him he is not in office today and he would like to speak with a nurse. Pt stated he is having trouble with his incision form the surgery  On 2/8/23. There is blood.  Please advise

## 2023-03-02 NOTE — TELEPHONE ENCOUNTER
I s/w MAURILIO about this pt and asked him about providing p with the work excuse letter and he gave me verbal auth to do so. I generated the letters and I called the # dino Back at [facility] for her fax # to send the pt's work excuse and had to Jenae.

## 2023-03-02 NOTE — TELEPHONE ENCOUNTER
Nazanin called back from pt's Human resources dept and informed that the fax # is the same as her phone # and I told her that I will fax the notes and I did this and received a fax confirmation. I also called pt at the # he gave me to let him know that I sent the letters but he did not answer so I LM that I send them to Nazanin in his human resources dept.

## 2023-03-06 ENCOUNTER — HOSPITAL ENCOUNTER (EMERGENCY)
Facility: HOSPITAL | Age: 63
Discharge: HOME | End: 2023-03-06
Attending: EMERGENCY MEDICINE
Payer: COMMERCIAL

## 2023-03-06 ENCOUNTER — NURSE ONLY (OUTPATIENT)
Dept: SURGERY | Facility: CLINIC | Age: 63
End: 2023-03-06

## 2023-03-06 VITALS
OXYGEN SATURATION: 99 % | SYSTOLIC BLOOD PRESSURE: 128 MMHG | TEMPERATURE: 97 F | HEART RATE: 91 BPM | HEIGHT: 68 IN | BODY MASS INDEX: 28.79 KG/M2 | DIASTOLIC BLOOD PRESSURE: 81 MMHG | WEIGHT: 190 LBS | RESPIRATION RATE: 20 BRPM

## 2023-03-06 DIAGNOSIS — R31.0 GROSS HEMATURIA: Primary | ICD-10-CM

## 2023-03-06 DIAGNOSIS — R31.9 HEMATURIA, UNSPECIFIED TYPE: ICD-10-CM

## 2023-03-06 DIAGNOSIS — T83.9XXA PROBLEM WITH FOLEY CATHETER, INITIAL ENCOUNTER (HCC): Primary | ICD-10-CM

## 2023-03-06 DIAGNOSIS — R33.9 URINARY RETENTION: ICD-10-CM

## 2023-03-06 LAB
ANION GAP SERPL CALC-SCNC: 5 MMOL/L (ref 0–18)
BASOPHILS # BLD AUTO: 0.06 X10(3) UL (ref 0–0.2)
BASOPHILS NFR BLD AUTO: 0.7 %
BUN BLD-MCNC: 14 MG/DL (ref 7–18)
BUN/CREAT SERPL: 10.1 (ref 10–20)
CALCIUM BLD-MCNC: 9.2 MG/DL (ref 8.5–10.1)
CHLORIDE SERPL-SCNC: 109 MMOL/L (ref 98–112)
CO2 SERPL-SCNC: 25 MMOL/L (ref 21–32)
CREAT BLD-MCNC: 1.38 MG/DL
DEPRECATED RDW RBC AUTO: 45.2 FL (ref 35.1–46.3)
EOSINOPHIL # BLD AUTO: 0.11 X10(3) UL (ref 0–0.7)
EOSINOPHIL NFR BLD AUTO: 1.4 %
ERYTHROCYTE [DISTWIDTH] IN BLOOD BY AUTOMATED COUNT: 13.2 % (ref 11–15)
GFR SERPLBLD BASED ON 1.73 SQ M-ARVRAT: 58 ML/MIN/1.73M2 (ref 60–?)
GLUCOSE BLD-MCNC: 106 MG/DL (ref 70–99)
HCT VFR BLD AUTO: 34 %
HGB BLD-MCNC: 11.2 G/DL
IMM GRANULOCYTES # BLD AUTO: 0.04 X10(3) UL (ref 0–1)
IMM GRANULOCYTES NFR BLD: 0.5 %
LYMPHOCYTES # BLD AUTO: 1.76 X10(3) UL (ref 1–4)
LYMPHOCYTES NFR BLD AUTO: 21.9 %
MCH RBC QN AUTO: 30.7 PG (ref 26–34)
MCHC RBC AUTO-ENTMCNC: 32.9 G/DL (ref 31–37)
MCV RBC AUTO: 93.2 FL
MONOCYTES # BLD AUTO: 0.38 X10(3) UL (ref 0.1–1)
MONOCYTES NFR BLD AUTO: 4.7 %
NEUTROPHILS # BLD AUTO: 5.68 X10 (3) UL (ref 1.5–7.7)
NEUTROPHILS # BLD AUTO: 5.68 X10(3) UL (ref 1.5–7.7)
NEUTROPHILS NFR BLD AUTO: 70.8 %
OSMOLALITY SERPL CALC.SUM OF ELEC: 289 MOSM/KG (ref 275–295)
PLATELET # BLD AUTO: 292 10(3)UL (ref 150–450)
POTASSIUM SERPL-SCNC: 4 MMOL/L (ref 3.5–5.1)
RBC # BLD AUTO: 3.65 X10(6)UL
SODIUM SERPL-SCNC: 139 MMOL/L (ref 136–145)
WBC # BLD AUTO: 8 X10(3) UL (ref 4–11)

## 2023-03-06 PROCEDURE — 99283 EMERGENCY DEPT VISIT LOW MDM: CPT

## 2023-03-06 PROCEDURE — 85025 COMPLETE CBC W/AUTO DIFF WBC: CPT

## 2023-03-06 PROCEDURE — 85025 COMPLETE CBC W/AUTO DIFF WBC: CPT | Performed by: EMERGENCY MEDICINE

## 2023-03-06 PROCEDURE — 87086 URINE CULTURE/COLONY COUNT: CPT | Performed by: NURSE PRACTITIONER

## 2023-03-06 PROCEDURE — 36415 COLL VENOUS BLD VENIPUNCTURE: CPT

## 2023-03-06 PROCEDURE — 80048 BASIC METABOLIC PNL TOTAL CA: CPT | Performed by: EMERGENCY MEDICINE

## 2023-03-06 PROCEDURE — 80048 BASIC METABOLIC PNL TOTAL CA: CPT

## 2023-03-06 RX ORDER — IBUPROFEN 600 MG/1
600 TABLET ORAL ONCE
Status: COMPLETED | OUTPATIENT
Start: 2023-03-06 | End: 2023-03-06

## 2023-03-06 NOTE — TELEPHONE ENCOUNTER
Pt is at the  asking to speak with a nurse. Pt stated that he is having issues Post op. Stated that he went to ER and nothing was done.  Please advise

## 2023-03-06 NOTE — PROGRESS NOTES
I called pt into the exam room and I used the language line tablet for assistance in Salvadorean interpretation with Ashlee Bailey , ID # [id number] and I determined that since pt left the ER on 3/1 he continues to have dark red bloody urine every time he urinates, throughout the entire urine stream. Has medium sized clots that he states are sometimes difficult to pass. Also has burning with urination, frequency and a feeling that he is not emptying his bladder. Also feels a fullness in his bladder area. I took his VS and he did not have a fever. I asked pt to empty his bladder in the BR and I gave him a container to collect his urine so that I could see the color and I will then perform a bladder scan to see if he is retaining urine. Pt came out of the BR and I observed that the urine color was dark red but I did not see any clots. I brought pt back to the exam room and placed him in supine position on the exam table and asked him to lower his bottom clothing so as to expose the lower abd/pelvic area. I performed the bladder scan and noted 38 ml retained. I told pt that [facility] [place] [facility] of the office I will s/w the NP, VALORIE and be back in a couple minutes. I s/w RAH and informed her of pt's issues and she gave verbal orders to place an 18 FR coude tip wyman cath and hand irrigate the bladder with 0.9 NS till urine is clear and collect a urine sample for C&S. Pt to then keep the wyman cath till wed. 3/8 when he should return for a n/v to reassess and determined if the cath can be removed. Pt to push fluids and he should also return to work tomorrow on [employer] duty, no lifting over 10 lbs and stay off work today. He may be provided a letter with this info for his employer. I generated the letter and the urine C&S order and went back to the exam room and informed pt of all of the orders and instructions from [facility]. Pt verbalized understanding and agreed to this plan.   I then prepped and draped the pt in sterile fashion and I proceeded to instill about 2/3 the amt of a Urojet (Lidocaine HCL 2 % gel- 20 ml) into the pt's urethra. I then constricted the head of the penis for about 1-2 min to prevent the gel from leaking out and to also allow sufficient numbing time before the cath placement. After the time elapsed I then proceeded to  insert an 18 FR coude tipped wyman catheter without difficulty and received a urine return of about 50 ml of dark red bloody urine without clots. Pt tolerated this well also. I then hand irrigated the pt's bladder with a total of 500 ml of sterile 0.9 NS till the urine was a pink color and I then attached extension tubing to the wyman cath and a leg bag to the tubing. I also placed the wyman cath in a STAT lock and I secured it to pt's Lt. Upper inner thigh. I then secured the drainage tubing and leg bag to the pt's Lt lower leg with the velcro straps. I then reviewed the use of aseptic technique when performing catheter care and when changing over to the night drainage bag. I then assisted the pt off the exam table and to redress. I also assisted pt to schd a N/V for wed. 3/8 at 9:20 am and told pt to make sure to push fluids. Pt verbalized understanding and compliance. Negative for agitation and behavioral problems. The patient is not nervous/anxious. Objective:   /76 (Site: Right Arm, Position: Sitting, Cuff Size: Large Adult)   Pulse 96   Temp 98.9 °F (37.2 °C) (Oral)   Resp 20   Ht 5' 3\" (1.6 m)   Wt 181 lb (82.1 kg)   LMP 12/15/2017   BMI 32.06 kg/m²     Physical Exam   Constitutional: She is oriented to person, place, and time. She appears well-developed and well-nourished. No distress. HENT:   Head: Normocephalic and atraumatic. Right Ear: Hearing and external ear normal. No drainage, swelling or tenderness. Tympanic membrane is not injected and not bulging. A middle ear effusion is present. No hemotympanum. Left Ear: Hearing and external ear normal. No drainage, swelling or tenderness. Tympanic membrane is not injected and not bulging. A middle ear effusion is present. No hemotympanum. Nose: Mucosal edema and rhinorrhea present. No sinus tenderness or nasal deformity. No epistaxis. Right sinus exhibits no maxillary sinus tenderness. Left sinus exhibits no maxillary sinus tenderness. Mouth/Throat: Uvula is midline and mucous membranes are normal. Posterior oropharyngeal erythema present. No oropharyngeal exudate. Eyes: Conjunctivae and EOM are normal. Pupils are equal, round, and reactive to light. Right eye exhibits no discharge. Left eye exhibits no discharge. Neck: Trachea normal. No JVD present. No thyroid mass and no thyromegaly present. Cardiovascular: Normal rate, regular rhythm, normal heart sounds and intact distal pulses. Exam reveals no gallop and no friction rub. No murmur heard. Pulmonary/Chest: Effort normal. No respiratory distress. She has no wheezes. She has rhonchi. She has no rales. She exhibits no tenderness. Lymphadenopathy:     She has cervical adenopathy. Right cervical: Superficial cervical adenopathy present. Left cervical: Superficial cervical adenopathy present.    Neurological: She is alert and oriented to person, place, and time. Coordination normal.   Skin: Skin is warm and dry. She is not diaphoretic. Psychiatric: She has a normal mood and affect. Her behavior is normal. Judgment and thought content normal.   Nursing note and vitals reviewed. Assessment:     1. Acute non-recurrent maxillary sinusitis     2. Bronchitis     3. Dysfunction of both eustachian tubes     4. Chills  POCT Influenza A/B       Plan:      Orders Placed This Encounter   Procedures    POCT Influenza A/B     Orders Placed This Encounter   Medications    azithromycin (ZITHROMAX) 250 MG tablet     Sig: Take 2 tabs (500 mg) on Day 1, and take 1 tab (250 mg) on days 2 through 5. Dispense:  1 packet     Refill:  1       Controlled Substances Monitoring:      No Follow-up on file. I, Cherelle Kaur CMA   , am scribing for and in the presence of Ashli Barrera DO. Electronically signed by :  Cherelle Martínez DO, personally performed the services described in this documentation, as scribed by Luc Kennedy CMA   in my presence, and it is both accurate and complete.  Electronically signed by: Ashli Barrera DO    12/21/17 8:05 AM    Ashli Barrera DO

## 2023-03-06 NOTE — ED QUICK NOTES
Pt states having blood from penis around his wyman catheter. States urine wasn't flowing. New leg bag was applied. A larger bag was given for home use. Pt verbalized understanding.

## 2023-03-06 NOTE — ED INITIAL ASSESSMENT (HPI)
Patient presents with bleeding around wyman catheter   Catheter was placed at Dr. Dai Lambert office at 11 this morning for blood in urine

## 2023-03-08 ENCOUNTER — NURSE ONLY (OUTPATIENT)
Dept: SURGERY | Facility: CLINIC | Age: 63
End: 2023-03-08

## 2023-03-08 VITALS — HEART RATE: 94 BPM | RESPIRATION RATE: 16 BRPM | SYSTOLIC BLOOD PRESSURE: 126 MMHG | DIASTOLIC BLOOD PRESSURE: 76 MMHG

## 2023-03-08 DIAGNOSIS — R31.0 GROSS HEMATURIA: Primary | ICD-10-CM

## 2023-03-08 PROCEDURE — 3078F DIAST BP <80 MM HG: CPT | Performed by: NURSE PRACTITIONER

## 2023-03-08 PROCEDURE — 3074F SYST BP LT 130 MM HG: CPT | Performed by: NURSE PRACTITIONER

## 2023-03-08 PROCEDURE — 51798 US URINE CAPACITY MEASURE: CPT | Performed by: NURSE PRACTITIONER

## 2023-03-08 PROCEDURE — 51700 IRRIGATION OF BLADDER: CPT | Performed by: NURSE PRACTITIONER

## 2023-03-08 NOTE — PROGRESS NOTES
I called the pt and his niece into the exam room and I also got the Citizen of [place] interpretor [place], [place] # [id number] on the IPad to translate and I looked at the color of pt's urine in the drainage bag and determined that it was dark red bloody and he stated that at 2 am the bag was full and he emptied it and there has not been any urine in the bag since. I told pt that I will inform the NP, VALORIE of this and be back with further instructions. I s/w RAH and to inform and she decided to come into the exam room and she irrigated the pt's bladder with a total of 1250 ml of 0.9 NS and she evacuated many large blood clots during this process and the urine color was pink colored. She gave instructions for pt to see [facility] for an O/V for Tues. 3/14 at 9:30 am and also have pt return in about an hour or so for us to take a look at the urine color to make sure it stays a lighter color. I went back to the pt and informed him of this and he agreed to the appt and to return in about an hour. Pt returned to the office and I called him and his niece into the exam room and I observed that his leg bag had about 125 ml of light red colored urine that I could easily see thru and there were no clots. I gave pt instructions to push as much fluids as possible, avoid any strenuous physical activity and call if the urine becomes very dark again or if the urine does not drain into the bag since a clot could block the urine flow. I also told him that if this should occur after hours he should seek [id number] in the ER. Pt verbalized understanding and compliance. Pt also had questions about going back to work and I explained that [facility] had me make the letter for no heavy lifting over 10 lbs at work and if he goes back to work his employer has to honor that and if they cannot provide him light duty work thenhe will have to stay home with pay. Pt informed that his supervisor told him that he has to be able to lift 40 or more pounds.  I told pt that his supervisor and the HR dept at his work need to be communicating with our forms dept as they manage all FMLA and disability forms. I also s/w Aunsanjay Garza About all of this and told her about pt's recent issues with bleeding and she will s/w Leighann in the Forms dept and have her call pt also and told her that she may need to amend the FMLA if pt is having a change in his condition. I gave pt this info also. Pt verbalized understanding and compliance.

## 2023-03-09 ENCOUNTER — HOSPITAL ENCOUNTER (OUTPATIENT)
Facility: HOSPITAL | Age: 63
Setting detail: OBSERVATION
Discharge: HOME | End: 2023-03-11
Attending: EMERGENCY MEDICINE | Admitting: HOSPITALIST
Payer: COMMERCIAL

## 2023-03-09 ENCOUNTER — TELEPHONE (OUTPATIENT)
Dept: SURGERY | Facility: CLINIC | Age: 63
End: 2023-03-09

## 2023-03-09 DIAGNOSIS — R31.9 HEMATURIA, UNSPECIFIED TYPE: Primary | ICD-10-CM

## 2023-03-09 LAB
ANION GAP SERPL CALC-SCNC: 8 MMOL/L (ref 0–18)
ANTIBODY SCREEN: NEGATIVE
BASOPHILS # BLD AUTO: 0.04 X10(3) UL (ref 0–0.2)
BASOPHILS NFR BLD AUTO: 0.4 %
BUN BLD-MCNC: 21 MG/DL (ref 7–18)
BUN/CREAT SERPL: 16.2 (ref 10–20)
CALCIUM BLD-MCNC: 8.9 MG/DL (ref 8.5–10.1)
CHLORIDE SERPL-SCNC: 109 MMOL/L (ref 98–112)
CO2 SERPL-SCNC: 23 MMOL/L (ref 21–32)
CREAT BLD-MCNC: 1.3 MG/DL
DEPRECATED RDW RBC AUTO: 46 FL (ref 35.1–46.3)
EOSINOPHIL # BLD AUTO: 0.05 X10(3) UL (ref 0–0.7)
EOSINOPHIL NFR BLD AUTO: 0.5 %
ERYTHROCYTE [DISTWIDTH] IN BLOOD BY AUTOMATED COUNT: 13.5 % (ref 11–15)
GFR SERPLBLD BASED ON 1.73 SQ M-ARVRAT: 63 ML/MIN/1.73M2 (ref 60–?)
GLUCOSE BLD-MCNC: 107 MG/DL (ref 70–99)
HCT VFR BLD AUTO: 30.6 %
HGB BLD-MCNC: 10 G/DL
IMM GRANULOCYTES # BLD AUTO: 0.04 X10(3) UL (ref 0–1)
IMM GRANULOCYTES NFR BLD: 0.4 %
INR BLD: 1.06 (ref 0.85–1.16)
LYMPHOCYTES # BLD AUTO: 1.61 X10(3) UL (ref 1–4)
LYMPHOCYTES NFR BLD AUTO: 16.3 %
MCH RBC QN AUTO: 30.3 PG (ref 26–34)
MCHC RBC AUTO-ENTMCNC: 32.7 G/DL (ref 31–37)
MCV RBC AUTO: 92.7 FL
MONOCYTES # BLD AUTO: 0.51 X10(3) UL (ref 0.1–1)
MONOCYTES NFR BLD AUTO: 5.2 %
NEUTROPHILS # BLD AUTO: 7.62 X10 (3) UL (ref 1.5–7.7)
NEUTROPHILS # BLD AUTO: 7.62 X10(3) UL (ref 1.5–7.7)
NEUTROPHILS NFR BLD AUTO: 77.2 %
OSMOLALITY SERPL CALC.SUM OF ELEC: 293 MOSM/KG (ref 275–295)
PLATELET # BLD AUTO: 276 10(3)UL (ref 150–450)
POTASSIUM SERPL-SCNC: 4.1 MMOL/L (ref 3.5–5.1)
PROTHROMBIN TIME: 13.7 SECONDS (ref 11.6–14.8)
RBC # BLD AUTO: 3.3 X10(6)UL
RH BLOOD TYPE: POSITIVE
SARS-COV-2 RNA RESP QL NAA+PROBE: NOT DETECTED
SODIUM SERPL-SCNC: 140 MMOL/L (ref 136–145)
WBC # BLD AUTO: 9.9 X10(3) UL (ref 4–11)

## 2023-03-09 PROCEDURE — 99222 1ST HOSP IP/OBS MODERATE 55: CPT | Performed by: HOSPITALIST

## 2023-03-09 RX ORDER — MORPHINE SULFATE 4 MG/ML
4 INJECTION, SOLUTION INTRAMUSCULAR; INTRAVENOUS EVERY 2 HOUR PRN
Status: DISCONTINUED | OUTPATIENT
Start: 2023-03-09 | End: 2023-03-11

## 2023-03-09 RX ORDER — HYDROCODONE BITARTRATE AND ACETAMINOPHEN 5; 325 MG/1; MG/1
2 TABLET ORAL EVERY 4 HOURS PRN
Status: DISCONTINUED | OUTPATIENT
Start: 2023-03-09 | End: 2023-03-11

## 2023-03-09 RX ORDER — MORPHINE SULFATE 2 MG/ML
1 INJECTION, SOLUTION INTRAMUSCULAR; INTRAVENOUS EVERY 2 HOUR PRN
Status: DISCONTINUED | OUTPATIENT
Start: 2023-03-09 | End: 2023-03-11

## 2023-03-09 RX ORDER — ACETAMINOPHEN 325 MG/1
650 TABLET ORAL EVERY 4 HOURS PRN
Status: DISCONTINUED | OUTPATIENT
Start: 2023-03-09 | End: 2023-03-11

## 2023-03-09 RX ORDER — PROCHLORPERAZINE EDISYLATE 5 MG/ML
5 INJECTION INTRAMUSCULAR; INTRAVENOUS EVERY 8 HOURS PRN
Status: DISCONTINUED | OUTPATIENT
Start: 2023-03-09 | End: 2023-03-11

## 2023-03-09 RX ORDER — HYDROCODONE BITARTRATE AND ACETAMINOPHEN 5; 325 MG/1; MG/1
1 TABLET ORAL EVERY 4 HOURS PRN
Status: DISCONTINUED | OUTPATIENT
Start: 2023-03-09 | End: 2023-03-11

## 2023-03-09 RX ORDER — MAGNESIUM HYDROXIDE 1200 MG/15ML
1000 LIQUID ORAL AS NEEDED
Status: DISCONTINUED | OUTPATIENT
Start: 2023-03-09 | End: 2023-03-11

## 2023-03-09 RX ORDER — TEMAZEPAM 15 MG/1
15 CAPSULE ORAL NIGHTLY PRN
Status: DISCONTINUED | OUTPATIENT
Start: 2023-03-09 | End: 2023-03-11

## 2023-03-09 RX ORDER — MORPHINE SULFATE 2 MG/ML
2 INJECTION, SOLUTION INTRAMUSCULAR; INTRAVENOUS EVERY 2 HOUR PRN
Status: DISCONTINUED | OUTPATIENT
Start: 2023-03-09 | End: 2023-03-11

## 2023-03-09 RX ORDER — ONDANSETRON 2 MG/ML
4 INJECTION INTRAMUSCULAR; INTRAVENOUS EVERY 6 HOURS PRN
Status: DISCONTINUED | OUTPATIENT
Start: 2023-03-09 | End: 2023-03-11

## 2023-03-09 RX ORDER — MAGNESIUM HYDROXIDE 1200 MG/15ML
3000 LIQUID ORAL CONTINUOUS
Status: DISCONTINUED | OUTPATIENT
Start: 2023-03-09 | End: 2023-03-11

## 2023-03-09 NOTE — TELEPHONE ENCOUNTER
Per jeanette on [facility] pt had prostate surgery and was bleeding yesterday and today it is worse. No answer at [id number].  Please advise

## 2023-03-09 NOTE — ED INITIAL ASSESSMENT (HPI)
The patient who reports that he has had a wyman catheter in place for one week for hematuria. He now reports bleeding around the catheter and severe lower abdominal to pelvic pain.

## 2023-03-10 LAB
ANION GAP SERPL CALC-SCNC: 4 MMOL/L (ref 0–18)
BASOPHILS # BLD AUTO: 0.03 X10(3) UL (ref 0–0.2)
BASOPHILS NFR BLD AUTO: 0.5 %
BUN BLD-MCNC: 18 MG/DL (ref 7–18)
BUN/CREAT SERPL: 17.5 (ref 10–20)
CALCIUM BLD-MCNC: 8.4 MG/DL (ref 8.5–10.1)
CHLORIDE SERPL-SCNC: 109 MMOL/L (ref 98–112)
CO2 SERPL-SCNC: 26 MMOL/L (ref 21–32)
CREAT BLD-MCNC: 1.03 MG/DL
DEPRECATED RDW RBC AUTO: 46.3 FL (ref 35.1–46.3)
EOSINOPHIL # BLD AUTO: 0.28 X10(3) UL (ref 0–0.7)
EOSINOPHIL NFR BLD AUTO: 4.7 %
ERYTHROCYTE [DISTWIDTH] IN BLOOD BY AUTOMATED COUNT: 13.6 % (ref 11–15)
GFR SERPLBLD BASED ON 1.73 SQ M-ARVRAT: 83 ML/MIN/1.73M2 (ref 60–?)
GLUCOSE BLD-MCNC: 104 MG/DL (ref 70–99)
HCT VFR BLD AUTO: 27.5 %
HGB BLD-MCNC: 9.1 G/DL
IMM GRANULOCYTES # BLD AUTO: 0.02 X10(3) UL (ref 0–1)
IMM GRANULOCYTES NFR BLD: 0.3 %
LYMPHOCYTES # BLD AUTO: 1.64 X10(3) UL (ref 1–4)
LYMPHOCYTES NFR BLD AUTO: 27.6 %
MCH RBC QN AUTO: 30.6 PG (ref 26–34)
MCHC RBC AUTO-ENTMCNC: 33.1 G/DL (ref 31–37)
MCV RBC AUTO: 92.6 FL
MONOCYTES # BLD AUTO: 0.41 X10(3) UL (ref 0.1–1)
MONOCYTES NFR BLD AUTO: 6.9 %
NEUTROPHILS # BLD AUTO: 3.57 X10 (3) UL (ref 1.5–7.7)
NEUTROPHILS # BLD AUTO: 3.57 X10(3) UL (ref 1.5–7.7)
NEUTROPHILS NFR BLD AUTO: 60 %
OSMOLALITY SERPL CALC.SUM OF ELEC: 290 MOSM/KG (ref 275–295)
PLATELET # BLD AUTO: 233 10(3)UL (ref 150–450)
POTASSIUM SERPL-SCNC: 4.2 MMOL/L (ref 3.5–5.1)
RBC # BLD AUTO: 2.97 X10(6)UL
SODIUM SERPL-SCNC: 139 MMOL/L (ref 136–145)
WBC # BLD AUTO: 6 X10(3) UL (ref 4–11)

## 2023-03-10 PROCEDURE — 99222 1ST HOSP IP/OBS MODERATE 55: CPT

## 2023-03-10 PROCEDURE — 99233 SBSQ HOSP IP/OBS HIGH 50: CPT | Performed by: HOSPITALIST

## 2023-03-10 NOTE — TELEPHONE ENCOUNTER
I s/w Ayan Boyce RN at the [facility] office and she reported that she s/w pt's niece and pt is currently admitted and having CBI for gross hematuria.

## 2023-03-10 NOTE — TELEPHONE ENCOUNTER
This encounter is now closed. RN called patient's niece to follow up. No answer. Left message to call back office. Note, patient at ED 3/9.  Admitted

## 2023-03-10 NOTE — PLAN OF CARE
Problem: Patient Centered Care  Goal: Patient preferences are identified and integrated in the patient's plan of care  Description: Interventions:  - What would you like us to know as we care for you? From home with family   - Provide timely, complete, and accurate information to patient/family  - Incorporate patient and family knowledge, values, beliefs, and cultural backgrounds into the planning and delivery of care  - Encourage patient/family to participate in care and decision-making at the level they choose  - Honor patient and family perspectives and choices  Outcome: Progressing     A/o x4. No complaints of pain at this time. Family at bedside. Hourly rounding maintained. Vitals stable. CBI in place. Will continue to monitor.

## 2023-03-10 NOTE — H&P
[facility]    PATIENT'S NAME: Bridgett Lockwood   ATTENDING PHYSICIAN: Madeline Phillip MD   PATIENT ACCOUNT#:   [id number]    LOCATION:  [address]  MEDICAL RECORD #:   [id number]       YOB: 1960  ADMISSION DATE:       03/09/2023    HISTORY AND PHYSICAL EXAMINATION    CHIEF COMPLAINT:  Recurrent gross hematuria. HISTORY OF PRESENT ILLNESS:  Patient is a 36-year-old  male who had a bilobar transurethral resection of prostate on February 8. Since then, he had discharged home with a Campa catheter. He presented on March 1 to the emergency department with gross hematuria requiring irrigation of his bladder, then presented again on March 6 with the same problem, required irrigation. Yesterday, he was seen at the urology office and had an irrigation procedure with clearing of his urine. Today, he was at work and started noticing blood again in his Campa catheter. Came into the emergency department, placed on CBI system, and he will be admitted to the hospital.  CBC today showed hemoglobin of 10.0 which has been stable. Chemistry was unremarkable. PAST MEDICAL HISTORY:  Enlarged prostate status post TURP procedure on February 8. Denies any other past surgical or medical history. MEDICATIONS:  Please see medication reconciliation list.      ALLERGIES:  No known drug allergies. SOCIAL HISTORY:  No tobacco, alcohol, or drug use. Lives with his family. Independent in his basic activities of daily living. REVIEW OF SYSTEMS:  He has some discomfort in the hypogastric area, but no dysuria. No fever or chills. No flank pain. Other 12-point review of systems is negative. PHYSICAL EXAMINATION:    GENERAL:  Alert and oriented to time, place, and person. No acute distress. VITAL SIGNS:  Temperature 97.9, pulse 92, respiratory rate 20, blood pressure 119/84, pulse ox 99% on room air. HEENT:  Atraumatic. Oropharynx clear. Moist mucous membranes.   Normal hard and soft palate. Eyes:  Anicteric sclerae. NECK:  Supple. No lymphadenopathy. Trachea midline. Full range of motion. LUNGS:  Clear to auscultation bilaterally. Normal respiratory effort. HEART:  Regular rate, rhythm. S1 and S2 auscultated. No murmur. ABDOMEN:  Soft, nondistended. No tenderness. Positive bowel sounds. EXTREMITIES:  No edema, clubbing, or cyanosis. Campa catheter in place with pink-tinged color urine and CBI system. ASSESSMENT AND PLAN:  Recurrent hematuria post transurethral resection of prostate procedure. Patient will be admitted to general medical floor. Continuous bladder irrigation. Urology consult. NPO after midnight. Monitor his hemodynamic status. Further recommendations to follow.      Dictated By Drew Olivas MD  d: 03/09/2023 19:43:58  t: 03/09/2023 20:39:19  Job [id number]  UY/    cc: Adrián Marqeuz MD

## 2023-03-11 VITALS
SYSTOLIC BLOOD PRESSURE: 104 MMHG | OXYGEN SATURATION: 97 % | WEIGHT: 187.63 LBS | RESPIRATION RATE: 19 BRPM | HEART RATE: 89 BPM | BODY MASS INDEX: 29 KG/M2 | TEMPERATURE: 98 F | DIASTOLIC BLOOD PRESSURE: 64 MMHG

## 2023-03-11 PROCEDURE — 99239 HOSP IP/OBS DSCHRG MGMT >30: CPT | Performed by: HOSPITALIST

## 2023-03-11 PROCEDURE — 99024 POSTOP FOLLOW-UP VISIT: CPT | Performed by: UROLOGY

## 2023-03-11 RX ORDER — HYDROCODONE BITARTRATE AND ACETAMINOPHEN 5; 325 MG/1; MG/1
1 TABLET ORAL EVERY 4 HOURS PRN
Qty: 15 TABLET | Refills: 0 | Status: SHIPPED | OUTPATIENT
Start: 2023-03-11

## 2023-03-11 NOTE — PROGRESS NOTES
Examined patient at the bedside. CBI is clamped with yellow urine in the tubing. Patient has not ambulated significantly. Overall he feels well. Recommend patient to ambulate a few times today. Urine may turn light pink or remain yellow. If it turns dark red, would consider restarting CBI (urology should be contacted). If he ambulates, tolerates a diet, urine remains clear after ambulation he can be discharged today w ith Campa catheter. Please clamp the third port. He has a follow-up on Tuesday with Dr. Tran Masters.

## 2023-03-11 NOTE — CM/SW NOTE
03/11/23 0900   Discharge disposition   Expected discharge disposition Home or Self   Discharge transportation Private car     Pt received MDO for discharge. Pt is self, nn at this time. SW/CM to remain available for support and/or discharge planning.      Elson Call, MSW, LSW   x [phone]

## 2023-03-13 ENCOUNTER — PATIENT OUTREACH (OUTPATIENT)
Dept: CASE MANAGEMENT | Age: 63
End: 2023-03-13

## 2023-03-14 ENCOUNTER — OFFICE VISIT (OUTPATIENT)
Dept: SURGERY | Facility: CLINIC | Age: 63
End: 2023-03-14

## 2023-03-14 DIAGNOSIS — N40.1 BPH WITH OBSTRUCTION/LOWER URINARY TRACT SYMPTOMS: ICD-10-CM

## 2023-03-14 DIAGNOSIS — R33.9 URINARY RETENTION: ICD-10-CM

## 2023-03-14 DIAGNOSIS — R31.0 GROSS HEMATURIA: Primary | ICD-10-CM

## 2023-03-14 DIAGNOSIS — N13.8 BPH WITH OBSTRUCTION/LOWER URINARY TRACT SYMPTOMS: ICD-10-CM

## 2023-03-14 PROCEDURE — 99024 POSTOP FOLLOW-UP VISIT: CPT | Performed by: UROLOGY

## 2023-03-20 ENCOUNTER — OFFICE VISIT (OUTPATIENT)
Dept: SURGERY | Facility: CLINIC | Age: 63
End: 2023-03-20

## 2023-03-20 DIAGNOSIS — N40.1 BPH WITH OBSTRUCTION/LOWER URINARY TRACT SYMPTOMS: Primary | ICD-10-CM

## 2023-03-20 DIAGNOSIS — N13.8 BPH WITH OBSTRUCTION/LOWER URINARY TRACT SYMPTOMS: Primary | ICD-10-CM

## 2023-03-20 DIAGNOSIS — R33.9 URINARY RETENTION: ICD-10-CM

## 2023-03-20 PROCEDURE — 99024 POSTOP FOLLOW-UP VISIT: CPT | Performed by: UROLOGY

## 2023-03-20 PROCEDURE — 51798 US URINE CAPACITY MEASURE: CPT | Performed by: UROLOGY

## 2023-06-05 ENCOUNTER — OFFICE VISIT (OUTPATIENT)
Dept: SURGERY | Facility: CLINIC | Age: 63
End: 2023-06-05

## 2023-06-05 DIAGNOSIS — N40.1 BPH WITH OBSTRUCTION/LOWER URINARY TRACT SYMPTOMS: Primary | ICD-10-CM

## 2023-06-05 DIAGNOSIS — R33.9 URINARY RETENTION: ICD-10-CM

## 2023-06-05 DIAGNOSIS — N13.8 BPH WITH OBSTRUCTION/LOWER URINARY TRACT SYMPTOMS: Primary | ICD-10-CM

## 2023-06-05 PROCEDURE — 99213 OFFICE O/P EST LOW 20 MIN: CPT | Performed by: UROLOGY

## 2023-06-05 RX ORDER — FINASTERIDE 5 MG/1
5 TABLET, FILM COATED ORAL DAILY
Qty: 90 TABLET | Refills: 3 | Status: SHIPPED | OUTPATIENT
Start: 2023-06-05

## 2024-10-29 PROCEDURE — 88305 TISSUE EXAM BY PATHOLOGIST: CPT | Performed by: SURGERY

## 2024-11-19 ENCOUNTER — TELEPHONE (OUTPATIENT)
Dept: SURGERY | Facility: CLINIC | Age: 64
End: 2024-11-19

## 2024-11-19 NOTE — TELEPHONE ENCOUNTER
Received a refill request from [facility] for finasteride. LOV with Dr. Nettles was on 6/5/2023. Tried calling patient, prasanna yuan, left a voicemail to call back. He needs appointment before we can sent refills.

## 2024-11-20 NOTE — TELEPHONE ENCOUNTER
- received 2nd refill request from [facility] to refill finasteride, refill does not meet protocol, denial faxed to pharmacy

## 2025-05-19 ENCOUNTER — HOSPITAL ENCOUNTER (EMERGENCY)
Facility: HOSPITAL | Age: 65
Discharge: HOME | End: 2025-05-19
Attending: EMERGENCY MEDICINE

## 2025-05-19 VITALS
WEIGHT: 180 LBS | TEMPERATURE: 97 F | BODY MASS INDEX: 28.25 KG/M2 | SYSTOLIC BLOOD PRESSURE: 130 MMHG | DIASTOLIC BLOOD PRESSURE: 80 MMHG | OXYGEN SATURATION: 97 % | RESPIRATION RATE: 16 BRPM | HEART RATE: 95 BPM | HEIGHT: 67 IN

## 2025-05-19 DIAGNOSIS — R33.9 URINARY RETENTION: Primary | ICD-10-CM

## 2025-05-19 LAB
BILIRUB UR QL: NEGATIVE
CLARITY UR: CLEAR
COLOR UR: COLORLESS
GLUCOSE UR-MCNC: NORMAL MG/DL
KETONES UR-MCNC: 10 MG/DL
LEUKOCYTE ESTERASE UR QL STRIP.AUTO: NEGATIVE
NITRITE UR QL STRIP.AUTO: NEGATIVE
PH UR: 6.5 [PH] (ref 5–8)
PROT UR-MCNC: 30 MG/DL
SP GR UR STRIP: 1.01 (ref 1–1.03)
UROBILINOGEN UR STRIP-ACNC: NORMAL

## 2025-05-19 PROCEDURE — 81001 URINALYSIS AUTO W/SCOPE: CPT | Performed by: EMERGENCY MEDICINE

## 2025-05-19 PROCEDURE — 99284 EMERGENCY DEPT VISIT MOD MDM: CPT

## 2025-05-19 PROCEDURE — 51702 INSERT TEMP BLADDER CATH: CPT

## 2025-05-19 RX ORDER — FINASTERIDE 5 MG/1
5 TABLET, FILM COATED ORAL DAILY
Qty: 90 TABLET | Refills: 0 | Status: SHIPPED | OUTPATIENT
Start: 2025-05-19 | End: 2025-08-17

## 2025-05-19 NOTE — ED INITIAL ASSESSMENT (HPI)
Pt A/Ox4 presented to ED c/o urinary retention and lower abdominal pain and distention x1 days. Pt reported he was drinking alcohol last night and has not urinated since yesterday. Pt is reporting he is having L lower back pain. Denied fevers, chills, nausea, vomiting.

## 2025-05-20 ENCOUNTER — HOSPITAL ENCOUNTER (EMERGENCY)
Facility: HOSPITAL | Age: 65
Discharge: HOME | End: 2025-05-20
Attending: EMERGENCY MEDICINE

## 2025-05-20 ENCOUNTER — TELEPHONE (OUTPATIENT)
Dept: SURGERY | Facility: CLINIC | Age: 65
End: 2025-05-20

## 2025-05-20 VITALS
SYSTOLIC BLOOD PRESSURE: 138 MMHG | BODY MASS INDEX: 28 KG/M2 | RESPIRATION RATE: 16 BRPM | WEIGHT: 180 LBS | TEMPERATURE: 98 F | DIASTOLIC BLOOD PRESSURE: 84 MMHG | HEART RATE: 80 BPM | OXYGEN SATURATION: 98 %

## 2025-05-20 DIAGNOSIS — R33.9 URINARY RETENTION: Primary | ICD-10-CM

## 2025-05-20 LAB
BILIRUB UR QL: NEGATIVE
CLARITY UR: CLEAR
GLUCOSE UR-MCNC: NORMAL MG/DL
HGB UR QL STRIP.AUTO: NEGATIVE
LEUKOCYTE ESTERASE UR QL STRIP.AUTO: 25
NITRITE UR QL STRIP.AUTO: NEGATIVE
PH UR: 5.5 [PH] (ref 5–8)
PROT UR-MCNC: 20 MG/DL
SP GR UR STRIP: 1.02 (ref 1–1.03)
UROBILINOGEN UR STRIP-ACNC: NORMAL

## 2025-05-20 PROCEDURE — 81001 URINALYSIS AUTO W/SCOPE: CPT | Performed by: EMERGENCY MEDICINE

## 2025-05-20 PROCEDURE — 99283 EMERGENCY DEPT VISIT LOW MDM: CPT

## 2025-05-20 PROCEDURE — 99284 EMERGENCY DEPT VISIT MOD MDM: CPT

## 2025-05-20 PROCEDURE — 51702 INSERT TEMP BLADDER CATH: CPT

## 2025-05-20 PROCEDURE — 87086 URINE CULTURE/COLONY COUNT: CPT | Performed by: EMERGENCY MEDICINE

## 2025-05-20 RX ORDER — TAMSULOSIN HYDROCHLORIDE 0.4 MG/1
0.4 CAPSULE ORAL DAILY
Qty: 30 CAPSULE | Refills: 0 | Status: SHIPPED | OUTPATIENT
Start: 2025-05-20 | End: 2025-06-19

## 2025-05-20 NOTE — TELEPHONE ENCOUNTER
I called pt with the assistance of Ukrainian interpretor Sukhwinder, ID # [id number] and realized that pt was back in the ER so I told the interpretor to disconnect and I will try to reach pt tomorrow.

## 2025-05-20 NOTE — TELEPHONE ENCOUNTER
Can we please make an appointment for this patient for void trial in approximately 2 weeks with return visit 1 week afterward for PVR or repeat void trial if he fails the first one and provider visit?

## 2025-05-20 NOTE — ED PROVIDER NOTES
Patient Seen in: [facility] Emergency Department        History  Chief Complaint   Patient presents with    Urinary Symptoms     Stated Complaint: urinary problem    Subjective:   HPI            64-year-old male presents for evaluation of urinary retention.  Patient seen in the ER for the same thing yesterday, had Campa placed with 1 L retained urine drained, patient requested that Campa be removed prior to discharge.  Since then he has had urinary retention again as well as pain with urination and abdominal discomfort.  Abdominal discomfort has resolved after Campa placement.  No fever, chills, nausea, vomiting, abdominal pain other than sensation of needing to urinate which has now resolved.  Started taking finasteride yesterday, not currently on Flomax.      Objective:     Past Medical History:    BPH (benign prostatic hyperplasia)    UTI (urinary tract infection)              Past Surgical History:   Procedure Laterality Date    Colonoscopy      Colonoscopy N/A 10/29/2024    Procedure: COLONOSCOPY;  Surgeon: Justin Deng MD;  Location: [facility] MAIN OR                Social History     Socioeconomic History    Marital status: Single   Tobacco Use    Smoking status: Never    Smokeless tobacco: Never   Vaping Use    Vaping status: Never Used   Substance and Sexual Activity    Alcohol use: Yes     Comment: social    Drug use: Never                                Physical Exam    ED Triage Vitals   BP 05/20/25 1116 142/84   Pulse 05/20/25 1116 86   Resp 05/20/25 1116 18   Temp 05/20/25 1116 98 °F (36.7 °C)   Temp src --    SpO2 05/20/25 1116 98 %   O2 Device 05/20/25 1430 None (Room air)       Current Vitals:   Vital Signs  BP: 138/84  Pulse: 80  Resp: 16  Temp: 98 °F (36.7 °C)    Oxygen Therapy  SpO2: 98 %  O2 Device: None (Room air)            Physical Exam  Vitals and nursing note reviewed.   Constitutional:       General: He is not in acute distress.     Appearance: Normal appearance. He is not  toxic-appearing.   HENT:      Head: Normocephalic and atraumatic.   Eyes:      Conjunctiva/sclera: Conjunctivae normal.   Cardiovascular:      Rate and Rhythm: Normal rate and regular rhythm.   Pulmonary:      Effort: Pulmonary effort is normal. No respiratory distress.      Breath sounds: Normal breath sounds.   Abdominal:      Palpations: Abdomen is soft.      Tenderness: There is no abdominal tenderness.   Musculoskeletal:         General: Normal range of motion.      Cervical back: Normal range of motion and neck supple. No rigidity.   Neurological:      General: No focal deficit present.      Mental Status: He is alert.   Psychiatric:         Mood and Affect: Mood normal.                 ED Course  Labs Reviewed   URINALYSIS WITH CULTURE REFLEX - Abnormal; Notable for the following components:       Result Value    Ketones Urine Trace (*)     Protein Urine 20 (*)     Leukocyte Esterase Urine 25 (*)     Squamous Epi. Cells Few (*)     All other components within normal limits   URINE CULTURE, ROUTINE                            MDM             Medical Decision Making  Differential diagnosis includes but is not limited to urinary retention, BPH, bladder outlet obstruction, cystitis, malignancy    Well-appearing afebrile patient, urine culture from yesterday without growth at this point, results are pending.  UA today noted, will hold on treatment until culture is available.  Discussed with urology PA (Trent) who agrees with above plan, recommends adding Flomax to current finasteride with plan to follow-up in urology clinic in 2 weeks.  Discussed this with patient and niece at the bedside as well as return precautions.  They verbalized understanding of and agreement with this plan.    Problems Addressed:  Urinary retention: acute illness or injury     Details: Differential diagnoses encompassed high-risk conditions with potential threats to life, limb, or major bodily functions, warranting comprehensive evaluation  and high-complexity medical decision-making.      Amount and/or Complexity of Data Reviewed  External Data Reviewed: notes.     Details: Note from ER visit yesterday reviewed  Labs: ordered.  Discussion of management or test interpretation with external provider(s): Discussed with urology    Risk  Prescription drug management.  Risk Details:  used due to language barrier        Disposition and Plan     Clinical Impression:  1. Urinary retention         Disposition:  Discharge  5/20/2025  3:55 pm    Follow-up:  Marie Nettles MD  [address] [address]  [phone]    Schedule an appointment as soon as possible for a visit in 2 week(s)  For follow up          Medications Prescribed:  Discharge Medication List as of 5/20/2025  4:00 PM        START taking these medications    Details   tamsulosin (FLOMAX) 0.4 MG Oral Cap Take 1 capsule (0.4 mg total) by mouth daily., Normal, Disp-30 capsule, R-0                   Supplementary Documentation:

## 2025-05-20 NOTE — ED PROVIDER NOTES
Patient Seen in: [facility] Emergency Department    History     Chief Complaint   Patient presents with    Urinary Retention    Abdomen/Flank Pain       HPI    Patient presents to the ED complaining of being unable to urinate since last night.  He states he has lower abdominal pain and severe urgency.  He states retention started after drinking a lot of tequila last night.    History reviewed. Past Medical History[1]    History reviewed. Past Surgical History[2]      Medications :  Prescriptions Prior to Admission[3]     Family History[4]    Smoking Status: Social Hx on file[5]    Constitutional and vital signs reviewed.      Social History and Family History elements reviewed from today, pertinent positives to the presenting problem noted.    Physical Exam     ED Triage Vitals [05/19/25 1526]   BP (!) 171/93   Pulse 100   Resp 19   Temp 97.3 °F (36.3 °C)   Temp src Temporal   SpO2 98 %   O2 Device None (Room air)       All measures to prevent infection transmission during my interaction with the patient were taken. Handwashing was performed prior to and after the exam.  Stethoscope and any equipment used during my examination was cleaned with super sani-cloth germicidal wipes following the exam.     Physical Exam  Constitutional:       General: He is in acute distress.      Appearance: Normal appearance.   Pulmonary:      Effort: Pulmonary effort is normal. No respiratory distress.   Abdominal:      Tenderness: There is abdominal tenderness in the suprapubic area.   Neurological:      Mental Status: He is alert. Mental status is at baseline.   Psychiatric:         Mood and Affect: Mood normal.         Behavior: Behavior normal.         ED Course        Labs Reviewed   URINALYSIS WITH CULTURE REFLEX - Abnormal; Notable for the following components:       Result Value    Urine Color Colorless (*)     Ketones Urine 10 (*)     Blood Urine 1+ (*)     Protein Urine 30 (*)     RBC Urine 3-5 (*)     All other  components within normal limits       As Interpreted by me    Imaging Results Available and Reviewed while in ED: No results found.  ED Medications Administered: Medications - No data to display      MDM     Vitals:    05/19/25 1526 05/19/25 1645   BP: (!) 171/93 130/80   Pulse: 100 95   Resp: 19 16   Temp: 97.3 °F (36.3 °C)    TempSrc: Temporal    SpO2: 98% 97%   Weight: 81.6 kg    Height: 170.2 cm (5' 7\")      *I personally reviewed and interpreted all ED vitals.    Pulse Ox: 97%, Room air, Normal       Differential Diagnosis/ Diagnostic Considerations: Urinary retention, other    Complicating Factors: The patient already has does not have any pertinent problems on file. to contribute to the complexity of this ED evaluation.    Medical Decision Making  Patient presents with urinary retention.  Catheter placed and drained over 1 L.  Discomfort resolved.  Patient wishes to have the catheter removed prior to discharge.  This performed.  Will discharge on finasteride and recommended urology follow-up and return precautions.    Problems Addressed:  Urinary retention: acute illness or injury    Amount and/or Complexity of Data Reviewed  Labs: ordered. Decision-making details documented in ED Course.    Risk  Prescription drug management.        Condition upon leaving the department: Stable    Disposition and Plan     Clinical Impression:  1. Urinary retention        Disposition:  Discharge    Follow-up:  Marie Nettles MD  [address] [place] [id number]  [phone]    Schedule an appointment as soon as possible for a visit in 3 day(s)        Medications Prescribed:  Discharge Medication List as of 5/19/2025  5:41 PM        START taking these medications    Details   !! finasteride 5 MG Oral Tab Take 1 tablet (5 mg total) by mouth daily., Normal, Disp-90 tablet, R-0       !! - Potential duplicate medications found. Please discuss with provider.                           [1]   Past Medical History:   BPH  (benign prostatic hyperplasia)    UTI (urinary tract infection)   [2]   Past Surgical History:  Procedure Laterality Date    Colonoscopy      Colonoscopy N/A 10/29/2024    Procedure: COLONOSCOPY;  Surgeon: Justin Dneg MD;  Location: [facility] MAIN OR   [3] (Not in a hospital admission)  [4]   Family History  Problem Relation Age of Onset    No Known Problems Father     No Known Problems Mother     No Known Problems Sister     No Known Problems Sister     No Known Problems Sister     No Known Problems Sister     No Known Problems Brother     No Known Problems Brother     No Known Problems Brother     No Known Problems Brother    [5]   Social History  Socioeconomic History    Marital status: Single   Tobacco Use    Smoking status: Never    Smokeless tobacco: Never   Vaping Use    Vaping status: Never Used   Substance and Sexual Activity    Alcohol use: Yes     Comment: social    Drug use: Never

## 2025-05-20 NOTE — DISCHARGE INSTRUCTIONS
Start taking the medication (tamsulosin hide or Flomax) daily as prescribed.    Continue taking finasteride as previously prescribed.    See urology in 2 weeks for follow-up appointment.    Return to the ER if you develop decreased drainage from your Campa catheter, fever, or any emergent concerns.    ==================================    Comience a chris el medicamento (tamsulosina o Flomax) diariamente según lo prescrito.    Continúe tomando finasterida según lo prescrito previamente.    Consulte con urología en 2 semanas para ashli aristeo de seguimiento.    Regrese a urgencias si presenta disminución del drenaje de la sonda Campa, fiebre o cualquier otra emergencia.

## 2025-05-20 NOTE — ED INITIAL ASSESSMENT (HPI)
Patient complains of urinary retention with dysuria for two days, was seen here yesterday for same complaint

## 2025-05-23 ENCOUNTER — PATIENT OUTREACH (OUTPATIENT)
Dept: CASE MANAGEMENT | Age: 65
End: 2025-05-23

## 2025-05-23 NOTE — PROGRESS NOTES
Patient had recent Emergency Room visit, calling to offer Primary Care Physician follow-up appointment (discharged 05/20)    Dr Ganesh Reees  Internal Medicine  [address], [place] [id number] [phone]  Apt made:  Thu 05/29 @2:00pm  Confirmed w/pt   Closing encounter

## 2025-05-29 ENCOUNTER — OFFICE VISIT (OUTPATIENT)
Dept: INTERNAL MEDICINE CLINIC | Facility: CLINIC | Age: 65
End: 2025-05-29

## 2025-05-29 VITALS
BODY MASS INDEX: 30.45 KG/M2 | TEMPERATURE: 98 F | HEIGHT: 67 IN | SYSTOLIC BLOOD PRESSURE: 130 MMHG | HEART RATE: 96 BPM | WEIGHT: 194 LBS | OXYGEN SATURATION: 95 % | DIASTOLIC BLOOD PRESSURE: 84 MMHG | RESPIRATION RATE: 18 BRPM

## 2025-05-29 DIAGNOSIS — Z09 ENCOUNTER FOR EXAMINATION FOLLOWING TREATMENT AT HOSPITAL: Primary | ICD-10-CM

## 2025-05-29 DIAGNOSIS — N40.1 BENIGN PROSTATIC HYPERPLASIA WITH URINARY RETENTION: ICD-10-CM

## 2025-05-29 DIAGNOSIS — R33.8 BENIGN PROSTATIC HYPERPLASIA WITH URINARY RETENTION: ICD-10-CM

## 2025-05-29 DIAGNOSIS — R33.9 URINARY RETENTION: ICD-10-CM

## 2025-05-29 PROCEDURE — 3008F BODY MASS INDEX DOCD: CPT | Performed by: INTERNAL MEDICINE

## 2025-05-29 PROCEDURE — 99204 OFFICE O/P NEW MOD 45 MIN: CPT | Performed by: INTERNAL MEDICINE

## 2025-05-29 PROCEDURE — 3075F SYST BP GE 130 - 139MM HG: CPT | Performed by: INTERNAL MEDICINE

## 2025-05-29 PROCEDURE — 3079F DIAST BP 80-89 MM HG: CPT | Performed by: INTERNAL MEDICINE

## 2025-05-29 NOTE — PROGRESS NOTES
Subjective:     Patient ID: Sin Valerio is a 64 year old male.    HPI    Patient for first time to establish care and follow-up from the hospital where he was seen in the ER for urinary retention he had a Campa placed doing okay taking Flomax and finasteride he has a follow-up with urology next week denies any complaints today he will come in for annual physical    History/Other:   Review of Systems   Constitutional: Negative.  Negative for fatigue and fever.   HENT: Negative.  Negative for congestion.    Eyes: Negative.    Respiratory: Negative.  Negative for cough, shortness of breath and wheezing.    Cardiovascular: Negative.  Negative for chest pain, palpitations and leg swelling.   Gastrointestinal: Negative.    Endocrine: Negative for cold intolerance and heat intolerance.   Genitourinary:  Positive for difficulty urinating. Negative for dysuria, flank pain and hematuria.        Campa present   Musculoskeletal: Negative.  Negative for arthralgias, back pain and myalgias.   Skin: Negative.    Neurological: Negative.  Negative for dizziness, tremors, syncope, weakness and headaches.   Psychiatric/Behavioral: Negative.  Negative for agitation, behavioral problems and suicidal ideas. The patient is not nervous/anxious.      Current Medications[1]  Allergies:Allergies[2]    Past Medical History[3]   Past Surgical History[4]   Family History[5]   Social History: Short Social Hx on File[6]     Objective:   Physical Exam  Vitals and nursing note reviewed.   Constitutional:       Appearance: He is well-developed.   HENT:      Head: Normocephalic and atraumatic.      Right Ear: External ear normal.      Left Ear: External ear normal.      Nose: Nose normal.   Eyes:      Conjunctiva/sclera: Conjunctivae normal.      Pupils: Pupils are equal, round, and reactive to light.   Cardiovascular:      Rate and Rhythm: Normal rate and regular rhythm.      Heart sounds: Normal heart sounds.   Pulmonary:      Effort: Pulmonary  effort is normal.      Breath sounds: Normal breath sounds.   Abdominal:      General: Bowel sounds are normal.      Palpations: Abdomen is soft.   Genitourinary:     Penis: Normal.       Prostate: Normal.      Rectum: Normal.   Musculoskeletal:         General: Normal range of motion.      Cervical back: Normal range of motion and neck supple.   Skin:     General: Skin is warm and dry.   Neurological:      Mental Status: He is alert and oriented to person, place, and time.      Deep Tendon Reflexes: Reflexes are normal and symmetric.         Assessment & Plan:   1. Encounter for examination following treatment at hospital patient doing okay will come in for annual physical   2. Urinary retention Campa in place with follow-up with urology    3. Benign prostatic hyperplasia with urinary retention as above taking medication       No orders of the defined types were placed in this encounter.      Meds This Visit:  Requested Prescriptions      No prescriptions requested or ordered in this encounter       Imaging & Referrals:  None            [1]   Current Outpatient Medications   Medication Sig Dispense Refill    finasteride 5 MG Oral Tab Take 1 tablet (5 mg total) by mouth daily. 90 tablet 0    tamsulosin (FLOMAX) 0.4 MG Oral Cap Take 1 capsule (0.4 mg total) by mouth daily. (Patient not taking: Reported on 5/29/2025) 30 capsule 0   [2] No Known Allergies  [3]   Past Medical History:   BPH (benign prostatic hyperplasia)    UTI (urinary tract infection)   [4]   Past Surgical History:  Procedure Laterality Date    Colonoscopy      Colonoscopy N/A 10/29/2024    Procedure: COLONOSCOPY;  Surgeon: Justin Deng MD;  Location: [facility] MAIN OR   [5]   Family History  Problem Relation Age of Onset    No Known Problems Father     No Known Problems Mother     No Known Problems Sister     No Known Problems Sister     No Known Problems Sister     No Known Problems Sister     No Known Problems Brother     No Known Problems  Brother     No Known Problems Brother     No Known Problems Brother    [6]   Social History  Socioeconomic History    Marital status: Single   Tobacco Use    Smoking status: Never     Passive exposure: Never    Smokeless tobacco: Never   Vaping Use    Vaping status: Never Used   Substance and Sexual Activity    Alcohol use: Yes     Comment: social    Drug use: Never

## 2025-06-04 ENCOUNTER — NURSE ONLY (OUTPATIENT)
Dept: SURGERY | Facility: CLINIC | Age: 65
End: 2025-06-04

## 2025-06-04 DIAGNOSIS — R33.9 URINARY RETENTION: Primary | ICD-10-CM

## 2025-06-04 PROCEDURE — 51702 INSERT TEMP BLADDER CATH: CPT | Performed by: PHYSICIAN ASSISTANT

## 2025-06-04 NOTE — PROGRESS NOTES
- per KK order, pt presents for Voiding trial with RN   - I called pt into the exam room and introduced myself, verifying pt's name and  utilizing  Sin #[id number].    - I explained that KK gave orders to conduct a voiding trial/decath, I explained the procedure and pt agreed to proceed.   - While I held a privacy drape, pt lowered pants and underwear and I assisted pt onto the exam table placing pt in a supine position.   - I proceeded to detach the night bag and extension tubing from the wyman cath, discarding them,  then removed the wyman cath from the STAT lock and emoved the STAT lock also. Next wyman cath balloon deflated of its contents (10 ml) and end of wyman cath cleaned with a couple of alcohol wipes.  Omer syringe inserted into the wyman cath.   - pt's bladder filled with 300 ml of 0.9 NS via gravity at which point pt stated that he felt full and thought he could urinate.  - 16 Fr Wyman cath gently and slowly removed and I left pt alone for privacy to attempt to urinate.  Returned to room after 10 minutes, and pt unable to urinate, gave pt another 10 min and pt still unable to urinate.   - Consulted with KK who agreed that pt catheter should be replaced, and pt should be scheduled for a repeat VT in one week, pt to continue taking the tamsulosin and finasteride.  If pt fails the repeat VT next week, pt should be scheduled for a third VT one week later along with a provider visit at that time.   - Informed pt of plan to replace catheter and then repeat VT next week, and to continue taking medication.    - Pt acknowledged understanding of all and repositioned in supine position on exam table.  - Prepped penis with betadine; instill 2% Lido-gel 2-3min for pain management; insert 16Fr straight catheter; connected tubing to legbag; secured with stat-lock to top of left thigh; pt assist X1 from exam table.  - Pt redressed and was escorted to lobby to schedule NV on 25 @ 9am.    -  Encounter complete

## 2025-06-11 ENCOUNTER — TELEPHONE (OUTPATIENT)
Dept: SURGERY | Facility: CLINIC | Age: 65
End: 2025-06-11

## 2025-06-11 ENCOUNTER — NURSE ONLY (OUTPATIENT)
Dept: SURGERY | Facility: CLINIC | Age: 65
End: 2025-06-11

## 2025-06-11 DIAGNOSIS — R33.9 URINARY RETENTION: Primary | ICD-10-CM

## 2025-06-11 PROCEDURE — 87186 SC STD MICRODIL/AGAR DIL: CPT | Performed by: PHYSICIAN ASSISTANT

## 2025-06-11 PROCEDURE — 87077 CULTURE AEROBIC IDENTIFY: CPT | Performed by: PHYSICIAN ASSISTANT

## 2025-06-11 PROCEDURE — 87086 URINE CULTURE/COLONY COUNT: CPT | Performed by: PHYSICIAN ASSISTANT

## 2025-06-11 NOTE — PROGRESS NOTES
Pt returned to the uro  with c/o pain in his bladder area. I got the interpretor line on and I introduced myself and verified the pt's name and . I asked pt to try to urinate and I gave him a graduate and he tried but was unable to urinate a drop and he stated it was also painful. I had the interpretor explain that I will perform a bladder scan to determine if he was in fact he is retaining urine again and if he is I will then have to put a wyman cath back in and we will have to schd. a repeat voiding trial in a week. Pt agreed to this plan. I placed the pt in supine position on the table and I gave him a sheet to protect his clothing and I then performed the bladder scan and noted 547 ml of urine retained. I told pt that I will then place the wyman cath and I proceeded to prep and drape pt in sterile fashion and I inserted a 16 FR coude tip wyman cath without difficulty and drained 510 ml of cloudy urine with pus at the end of the drainage so I collected a urine sample to send for culture. I then inflated the cath balloon with 10 ml of sterile water and I then placed the cath in a stat lock and secured the cath to pt's Lt upper inner thigh. I also attached extension tubing and a leg bag to the cath and I secured the cath at the pt's Lt upper inner thigh and secure the leg bag with the velcro straps. I tok pt to the  and we arranged a N/V appt for voiding trial decath fro next week wed. Pt was thankful.

## 2025-06-11 NOTE — PROGRESS NOTES
I called the pt into the exam room and introduced myself and verified the pt's name and  with the assistance of [place] interpretor Natalie, ID # [id number], I explained that SALVADOR JORDAN gave v/o to conduct a  voiding trial /decath and I explained the procedure and pt agreed to proceed. Pt's urine color was yellow. I then asked pt to lower his bottom clothing to his ankles and sit at the end of the exam table and I gave him a sheet to cover himself. I then placed him in a supine position on the table.  I then proceeded to detach the leg bag and extension tubing from the wyman cath and I discarded them. I then removed the wyman cath from the STAT lock at his upper inner Lt. Thigh and I then removed the STAT lock also. I then deflated the wyman cath balloon of its contents (10 ml) and I cleansed the end of the wyman cath with a couple of alcohol wipes and then inserted the barrel of a marilyn syringe into the wyman cath. I then proceeded to fill the pt's bladder with 150 ml of 0.9 NS via gravity at which point pt started to have a bladder contraction so I removed the wyman cath and gave pt a graduate and assisted him to a standing position. Pt was able to urinate 75 ml of clear yellow urine and after a couple minutes he urinate 20 ml more for a total of 95 ml. Pt's urine stream was fair. Pt  denied pain or burning with urination and he tolerated it well. I gave pt some wipes to clean up and I gave him instructions to measure and record his urine output and call to report if the amt of output decreases and voids are more frequent. I gave him a graduate and a voiding diary sheet. I also suggested that he come in tomorrow for a N/V for PVR bladder scan to make sure he is not retaining again. We arranged an appt for tomorrow at 8:20 am. I also suggested he make sure to avoid constipation as this contributes to urinary retention.

## 2025-06-11 NOTE — TELEPHONE ENCOUNTER
Javier Newman, I did a voiding trial this morning with pt and instilled 150 ml of saline and he was able to urinate 95 ml with a fair stream. I told him to monitor his urine output and record and I arranged a N/V appt for tomorrow for PVR/Bladder Scan to make sure he isn't retaining again. I'll let you know tomorrow.

## 2025-06-11 NOTE — TELEPHONE ENCOUNTER
Emmanuel Newman Pt came back this afternoon with c/o bladder pain and inability to urinate. Bladder scan showed 547 ml and I placed a 16 FR coude tip wyman and also sent a C&S because there was pus in his urine. I also arranged a N/V for voiding trial next week Wed.

## 2025-06-12 NOTE — TELEPHONE ENCOUNTER
Sounds good. Since this will be his 3rd attempt at void trial, we should set him up for a provider visit 1-2 days after that attempt to talk about next steps should he fail or PVR to ensure he is adequately emptying.

## 2025-06-16 ENCOUNTER — TELEPHONE (OUTPATIENT)
Dept: SURGERY | Facility: CLINIC | Age: 65
End: 2025-06-16

## 2025-06-16 NOTE — TELEPHONE ENCOUNTER
LMTCB    Tried calling all phone numbers in patient's chart,also tried both emergency contact numbers, no answer on any of the phones.

## 2025-06-18 ENCOUNTER — NURSE ONLY (OUTPATIENT)
Dept: SURGERY | Facility: CLINIC | Age: 65
End: 2025-06-18

## 2025-06-18 DIAGNOSIS — R33.9 URINARY RETENTION: Primary | ICD-10-CM

## 2025-06-18 PROCEDURE — 51700 IRRIGATION OF BLADDER: CPT | Performed by: PHYSICIAN ASSISTANT

## 2025-06-18 RX ORDER — TAMSULOSIN HYDROCHLORIDE 0.4 MG/1
0.4 CAPSULE ORAL DAILY
Qty: 90 CAPSULE | Refills: 3 | Status: SHIPPED | OUTPATIENT
Start: 2025-06-18 | End: 2026-06-13

## 2025-06-18 NOTE — PROGRESS NOTES
I called the pt into the exam room and introduced myself and verified the spelling of his last name and his  with the use of a Frisian interpretor Florentino, ID # [id number]. I explained that KK gave written orders to conduct a voiding trial /decath and I explained the procedure and I explained that if he fails the trial he will need an O/V to discuss further TX options. Pt's urine color is cloudy yellow and I asked pt if he picked up the abx med that we sent to his [facility] and he stated he did not. I told him that we sent him a my chart msg and left him a VM msg to pick it up. I  advised him to please do so as soon as he leaves here and pt also asked for a refill on the Tamsulosin which I told him I would send. I then asked pt to lower his bottom clothing to his ankles and sit at the end of the exam table and I gave him a sheet to cover himself. I then placed him in a supine position on the table.  I then proceeded to detach the leg bag and extension tubing from the wyman cath and I discarded them. I then removed the wyman cath from the STAT lock at his upper inner Lt. Thigh and I then removed the STAT lock also. I then deflated the wyman cath balloon of its contents (10 ml) and I cleansed the end of the wyman cath with a couple of alcohol wipes and then inserted the barrel of a marilyn syringe into the wyman cath. I then proceeded to fill the pt's bladder with 160 ml of 0.9 NS via gravity at which point pt stated that he felt full and thought he could urinate. I then removed the wyman cath gently and gave pt a graduate to urinate into at which time he was able to urinate 120 ml of clear yellow urine with a fair stream. Pt  denied pain or burning with urination and he tolerated it well. I gave pt some wipes to clean up and I asked that he call if he has any urinary issues and if he does not urinate at all after this trial to come in by 2:30 pm. Pt verbalized understanding of this and will call to report later today.

## 2025-08-16 ENCOUNTER — OFFICE VISIT (OUTPATIENT)
Dept: INTERNAL MEDICINE CLINIC | Facility: CLINIC | Age: 65
End: 2025-08-16

## 2025-08-16 VITALS
RESPIRATION RATE: 16 BRPM | SYSTOLIC BLOOD PRESSURE: 130 MMHG | DIASTOLIC BLOOD PRESSURE: 84 MMHG | TEMPERATURE: 98 F | BODY MASS INDEX: 30.71 KG/M2 | HEIGHT: 67 IN | WEIGHT: 195.63 LBS | OXYGEN SATURATION: 97 % | HEART RATE: 80 BPM

## 2025-08-16 DIAGNOSIS — G89.29 CHRONIC BILATERAL LOW BACK PAIN, UNSPECIFIED WHETHER SCIATICA PRESENT: ICD-10-CM

## 2025-08-16 DIAGNOSIS — N40.1 BENIGN PROSTATIC HYPERPLASIA WITH URINARY RETENTION: ICD-10-CM

## 2025-08-16 DIAGNOSIS — M54.50 CHRONIC BILATERAL LOW BACK PAIN, UNSPECIFIED WHETHER SCIATICA PRESENT: ICD-10-CM

## 2025-08-16 DIAGNOSIS — R33.8 BENIGN PROSTATIC HYPERPLASIA WITH URINARY RETENTION: ICD-10-CM

## 2025-08-16 DIAGNOSIS — Z87.898 HISTORY OF URINARY RETENTION: ICD-10-CM

## 2025-08-16 DIAGNOSIS — K42.9 UMBILICAL HERNIA WITHOUT OBSTRUCTION AND WITHOUT GANGRENE: ICD-10-CM

## 2025-08-16 DIAGNOSIS — Z00.00 ANNUAL PHYSICAL EXAM: Primary | ICD-10-CM

## 2025-08-18 ENCOUNTER — LAB ENCOUNTER (OUTPATIENT)
Dept: LAB | Facility: HOSPITAL | Age: 65
End: 2025-08-18
Attending: INTERNAL MEDICINE

## 2025-08-18 DIAGNOSIS — N40.1 BENIGN PROSTATIC HYPERPLASIA WITH URINARY RETENTION: ICD-10-CM

## 2025-08-18 DIAGNOSIS — G89.29 CHRONIC BILATERAL LOW BACK PAIN, UNSPECIFIED WHETHER SCIATICA PRESENT: ICD-10-CM

## 2025-08-18 DIAGNOSIS — M54.50 CHRONIC BILATERAL LOW BACK PAIN, UNSPECIFIED WHETHER SCIATICA PRESENT: ICD-10-CM

## 2025-08-18 DIAGNOSIS — R33.8 BENIGN PROSTATIC HYPERPLASIA WITH URINARY RETENTION: ICD-10-CM

## 2025-08-18 DIAGNOSIS — K42.9 UMBILICAL HERNIA WITHOUT OBSTRUCTION AND WITHOUT GANGRENE: ICD-10-CM

## 2025-08-18 DIAGNOSIS — Z00.00 ANNUAL PHYSICAL EXAM: ICD-10-CM

## 2025-08-18 DIAGNOSIS — R73.09 ELEVATED GLUCOSE LEVEL: Primary | ICD-10-CM

## 2025-08-18 DIAGNOSIS — R73.09 ELEVATED GLUCOSE LEVEL: ICD-10-CM

## 2025-08-18 LAB
ALBUMIN SERPL-MCNC: 4.5 G/DL (ref 3.2–4.8)
ALBUMIN/GLOB SERPL: 1.5 (ref 1–2)
ALP LIVER SERPL-CCNC: 86 U/L (ref 45–117)
ALT SERPL-CCNC: 23 U/L (ref 10–49)
ANION GAP SERPL CALC-SCNC: 7 MMOL/L (ref 0–18)
AST SERPL-CCNC: 23 U/L (ref ?–34)
BASOPHILS # BLD AUTO: 0.05 X10(3) UL (ref 0–0.2)
BASOPHILS NFR BLD AUTO: 0.6 %
BILIRUB SERPL-MCNC: 0.7 MG/DL (ref 0.2–1.1)
BILIRUB UR QL: NEGATIVE
BUN BLD-MCNC: 15 MG/DL (ref 9–23)
BUN/CREAT SERPL: 13.9 (ref 10–20)
CALCIUM BLD-MCNC: 9.2 MG/DL (ref 8.7–10.4)
CHLORIDE SERPL-SCNC: 105 MMOL/L (ref 98–112)
CHOLEST SERPL-MCNC: 208 MG/DL (ref ?–200)
CLARITY UR: CLEAR
CO2 SERPL-SCNC: 25 MMOL/L (ref 21–32)
COMPLEXED PSA SERPL-MCNC: 1.77 NG/ML (ref ?–4)
CREAT BLD-MCNC: 1.08 MG/DL (ref 0.7–1.3)
DEPRECATED RDW RBC AUTO: 42.3 FL (ref 35.1–46.3)
EGFRCR SERPLBLD CKD-EPI 2021: 77 ML/MIN/1.73M2 (ref 60–?)
EOSINOPHIL # BLD AUTO: 0.17 X10(3) UL (ref 0–0.7)
EOSINOPHIL NFR BLD AUTO: 2.2 %
ERYTHROCYTE [DISTWIDTH] IN BLOOD BY AUTOMATED COUNT: 12.5 % (ref 11–15)
EST. AVERAGE GLUCOSE BLD GHB EST-MCNC: 114 MG/DL (ref 68–126)
FASTING PATIENT LIPID ANSWER: YES
FASTING STATUS PATIENT QL REPORTED: YES
GLOBULIN PLAS-MCNC: 3.1 G/DL (ref 2–3.5)
GLUCOSE BLD-MCNC: 107 MG/DL (ref 70–99)
GLUCOSE UR-MCNC: NORMAL MG/DL
HBA1C MFR BLD: 5.6 % (ref ?–5.7)
HCT VFR BLD AUTO: 39.4 % (ref 39–53)
HDLC SERPL-MCNC: 41 MG/DL (ref 40–59)
HGB BLD-MCNC: 13.1 G/DL (ref 13–17.5)
HGB UR QL STRIP.AUTO: NEGATIVE
IMM GRANULOCYTES # BLD AUTO: 0.02 X10(3) UL (ref 0–1)
IMM GRANULOCYTES NFR BLD: 0.3 %
KETONES UR-MCNC: NEGATIVE MG/DL
LDLC SERPL CALC-MCNC: 140 MG/DL (ref ?–100)
LEUKOCYTE ESTERASE UR QL STRIP.AUTO: 25
LYMPHOCYTES # BLD AUTO: 2.16 X10(3) UL (ref 1–4)
LYMPHOCYTES NFR BLD AUTO: 28 %
MCH RBC QN AUTO: 30.7 PG (ref 26–34)
MCHC RBC AUTO-ENTMCNC: 33.2 G/DL (ref 31–37)
MCV RBC AUTO: 92.3 FL (ref 80–100)
MONOCYTES # BLD AUTO: 0.52 X10(3) UL (ref 0.1–1)
MONOCYTES NFR BLD AUTO: 6.7 %
NEUTROPHILS # BLD AUTO: 4.8 X10 (3) UL (ref 1.5–7.7)
NEUTROPHILS # BLD AUTO: 4.8 X10(3) UL (ref 1.5–7.7)
NEUTROPHILS NFR BLD AUTO: 62.2 %
NITRITE UR QL STRIP.AUTO: NEGATIVE
NONHDLC SERPL-MCNC: 167 MG/DL (ref ?–130)
OSMOLALITY SERPL CALC.SUM OF ELEC: 285 MOSM/KG (ref 275–295)
PH UR: 5 (ref 5–8)
PLATELET # BLD AUTO: 238 10(3)UL (ref 150–450)
POTASSIUM SERPL-SCNC: 4.1 MMOL/L (ref 3.5–5.1)
PROT SERPL-MCNC: 7.6 G/DL (ref 5.7–8.2)
PROT UR-MCNC: NEGATIVE MG/DL
RBC # BLD AUTO: 4.27 X10(6)UL (ref 4.3–5.7)
SODIUM SERPL-SCNC: 137 MMOL/L (ref 136–145)
SP GR UR STRIP: 1.02 (ref 1–1.03)
TRIGL SERPL-MCNC: 148 MG/DL (ref 30–149)
TSI SER-ACNC: 2.47 UIU/ML (ref 0.55–4.78)
UROBILINOGEN UR STRIP-ACNC: NORMAL
VLDLC SERPL CALC-MCNC: 27 MG/DL (ref 0–30)
WBC # BLD AUTO: 7.7 X10(3) UL (ref 4–11)

## 2025-08-18 PROCEDURE — 80061 LIPID PANEL: CPT

## 2025-08-18 PROCEDURE — 93005 ELECTROCARDIOGRAM TRACING: CPT

## 2025-08-18 PROCEDURE — 84443 ASSAY THYROID STIM HORMONE: CPT

## 2025-08-18 PROCEDURE — 83036 HEMOGLOBIN GLYCOSYLATED A1C: CPT

## 2025-08-18 PROCEDURE — 93010 ELECTROCARDIOGRAM REPORT: CPT | Performed by: INTERNAL MEDICINE

## 2025-08-18 PROCEDURE — 81001 URINALYSIS AUTO W/SCOPE: CPT

## 2025-08-18 PROCEDURE — 80053 COMPREHEN METABOLIC PANEL: CPT

## 2025-08-18 PROCEDURE — 36415 COLL VENOUS BLD VENIPUNCTURE: CPT

## 2025-08-18 PROCEDURE — 85025 COMPLETE CBC W/AUTO DIFF WBC: CPT

## 2025-08-19 LAB
ATRIAL RATE: 75 BPM
P AXIS: 16 DEGREES
P-R INTERVAL: 156 MS
Q-T INTERVAL: 402 MS
QRS DURATION: 86 MS
QTC CALCULATION (BEZET): 448 MS
R AXIS: -11 DEGREES
T AXIS: 18 DEGREES
VENTRICULAR RATE: 75 BPM

## (undated) DEVICE — GAMMEX® PI HYBRID SIZE 8, STERILE POWDER-FREE SURGICAL GLOVE, POLYISOPRENE AND NEOPRENE BLEND: Brand: GAMMEX

## (undated) DEVICE — Device

## (undated) DEVICE — SYRINGE,TOOMEY,IRRIGATION,70CC,STERILE: Brand: MEDLINE

## (undated) DEVICE — MEDI-VAC NON-CONDUCTIVE SUCTION TUBING: Brand: CARDINAL HEALTH

## (undated) DEVICE — CUTTING LOOP, BIPOLAR, 24/26 FR.: Brand: N.A.

## (undated) DEVICE — URINE DRAINAGE BAG,NEEDLE SAMPLING, ANTI-REFLUX DEVICE, DRAIN TUBE: Brand: DOVER

## (undated) DEVICE — CATH URTH BDX IC 24FR FL 3

## (undated) DEVICE — SLEEVE KENDALL SCD EXPRESS MED

## (undated) DEVICE — 60 ML SYRINGE,TOOMEY TYPE: Brand: MONOJECT

## (undated) DEVICE — CUTTING LOOP, BIPOLAR, 0.30MM, 24/26 FR.: Brand: N.A.

## (undated) DEVICE — ENDOSCOPIC VALVE WITH ADAPTER.: Brand: SURSEAL® II

## (undated) DEVICE — EVAC URL LDSEN DF4 IBIR 64CM

## (undated) DEVICE — MEGADYNE ELECTRODE ADULT PT RT

## (undated) DEVICE — UROLOGY DRAIN BAG

## (undated) DEVICE — STERILE WATER 1000ML BTL

## (undated) DEVICE — SOL NACL IRRIG 0.9% 1000ML BTL

## (undated) DEVICE — SOLUTION  .9 3000ML

## (undated) DEVICE — CYSTO PACK: Brand: MEDLINE INDUSTRIES, INC.

## (undated) NOTE — LETTER
To Whom It May Concern:    Yomi Evans has been under our care regarding ongoing medical issues. Because of this, he has been required to restrict his physical activities. He was hospitalized from 3/9/23-3/11/23 for medical reasons. He may resume his usual activities, including work, on 3/13/23 with the following restrictions:    []  None     [x]    No heavy lifting (over 15 pounds) for      2         weeks   []    Part-time (no more than             hours per week) for               week   [x]  Other: No bending or twisting or other strenuous activity for 2 wks       Please feel free to contact us if there are any questions.       Sincerely,    Yany Davenport MD  [facility]  [address] [place] [address]  [address] [facility] [address]  [phone]        Document generated by:  Yany Davenport MD

## (undated) NOTE — LETTER
To Whom It May Concern:    Ludivina Martinez has been under our care regarding ongoing medical issues. Because of this, he has been required to restrict his physical activities. He was hospitalized from 3/9/23-3/11/23 for medical reasons. He may resume his usual activities, including work, on 3/13/23 with the following restrictions:    []  None     [x]    No heavy lifting (over 15 pounds) for    2          weeks   []    Part-time (no more than             hours per week) for               week   [x]  Other: No strenuous work for 2 wks       Please feel free to contact us if there are any questions.       Sincerely,          Yunier Hassan MD    [facility]  [address] [place] [address]  [id number] [place] [address] [address] [address] [phone]        Document generated by:  Yunier Hassan MD

## (undated) NOTE — LETTER
3/14/2023              [address] [place] [address]         To Whom it may concern: This is to certify that [facility]ELI had an appointment in our office on 3/14/2023.  He is able to return to work full duty no restrictions on 3/27/2023         Sincerely,    Delvin Ledesma MD  [facility] [address] [phone]        Document electronically generated by:  Aislinn Corea MA

## (undated) NOTE — LETTER
7/21/2022              [facility] PASSROSANGELA-CRANBERRY-ER        [address]        [facility] [address]         To Whom it may concern: This is to certify that [facility] HENRRYCRANBERRY-ER had an appointment on 7/21/2022 at 11:40 AM with [address]         Sincerely,    Juwan Redding, APN  [address] [place] [address], [address]  [phone]

## (undated) NOTE — LETTER
6/5/2023              [address] [place] [address]         To Whom it may concern:     This is to certify that [facility] had an appointment on 6/5/2023 at 8:58 AM with Adamaris Sheikh MD.        Sincerely,    Adamaris Sheikh MD  [facility], [address] [address]  [phone]        Document electronically generated by:  Kenroy Prado MA

## (undated) NOTE — LETTER
2/20/2023              [facility]/ARACELI        [address] [place] [address]         To Whom it may concern: [facility] at 9:30 am with Nikos Ram MD.    Pt may return to work with  No Restrictions, full duty on  2/27/23.      Sincerely,    Nikos Ram MD  [facility] [address] [phone]        Document electronically generated by: ginger soliz

## (undated) NOTE — LETTER
[address]  Authorization for Surgical Operation and Procedure                                                                                           1. I hereby authorize Jose Basurto MD, my physician and his/her assistants (if applicable), which may include medical students, residents, and/or fellows, to perform the following surgical operation/ procedure and administer such anesthesia as may be determined necessary by my physician: Operation/Procedure name (s) CYSTOSCOPY, CLOT EVACUATION AND FULGURATION on [facility]   2. I recognize that during the surgical operation/procedure, unforeseen conditions may necessitate additional or different procedures than those listed above. I, therefore, further authorize and request that the above-named surgeon, assistants, or designees perform such procedures as are, in their judgment, necessary and desirable. 3.   My surgeon/physician has discussed prior to my surgery the potential benefits, risks and side effects of this procedure; the likelihood of achieving goals; and potential problems that might occur during recuperation. They also discussed reasonable alternatives to the procedure, including risks, benefits, and side effects related to the alternatives and risks related to not receiving this procedure. I have had all my questions answered and I acknowledge that no guarantee has been made as to the result that may be obtained. 4.   Should the need arise during my operation/procedure, which includes change of level of care prior to discharge, I also consent to the administration of blood and/or blood products. Further, I understand that despite careful testing and screening of blood or blood products by collecting agencies, I may still be subject to ill effects as a result of receiving a blood transfusion and/or blood products.   The following are some, but not all, of the potential risks that can occur: fever and allergic reactions, hemolytic reactions, transmission of diseases such as Hepatitis, AIDS and Cytomegalovirus (CMV) and fluid overload. In the event that I wish to have an autologous transfusion of my own blood, or a directed donor transfusion, I will discuss this with my physician. Check only if Refusing Blood or Blood Products  I understand refusal of blood or blood products as deemed necessary by my physician may have serious consequences to my condition to include possible death. I hereby assume responsibility for my refusal and release the hospital, its personnel, and my physicians from any responsibility for the consequences of my refusal.    o  Refuse   5. I authorize the use of any specimen, organs, tissues, body parts or foreign objects that may be removed from my body during the operation/procedure for diagnosis, research or teaching purposes and their subsequent disposal by hospital authorities. I also authorize the release of specimen test results and/or written reports to my treating physician on the hospital medical staff or other referring or consulting physicians involved in my care, at the discretion of the Pathologist or my treating physician. 6.   I consent to the photographing or videotaping of the operations or procedures to be performed, including appropriate portions of my body for medical, scientific, or educational purposes, provided my identity is not revealed by the pictures or by descriptive texts accompanying them. If the procedure has been photographed/videotaped, the surgeon will obtain the original picture, image, videotape or CD. The hospital will not be responsible for storage, release or maintenance of the picture, image, tape or CD.    7.   I consent to the presence of a  or observers in the operating room as deemed necessary by my physician or their designees.     8.   I recognize that in the event my procedure results in extended X-Ray/fluoroscopy time, I may develop a skin reaction. 9. If I have a Do Not Attempt Resuscitation (DNAR) order in place, that status will be suspended while in the operating room, procedural suite, and during the recovery period unless otherwise explicitly stated by me (or a person authorized to consent on my behalf). The surgeon or my attending physician will determine when the applicable recovery period ends for purposes of reinstating the DNAR order. 10. Patients having a sterilization procedure: I understand that if the procedure is successful the results will be permanent and it will therefore be impossible for me to inseminate, conceive, or bear children. I also understand that the procedure is intended to result in sterility, although the result has not been guaranteed. 11. I acknowledge that my physician has explained sedation/analgesia administration to me including the risk and benefits I consent to the administration of sedation/analgesia as may be necessary or desirable in the judgment of my physician. I CERTIFY THAT I HAVE READ AND FULLY UNDERSTAND THE ABOVE CONSENT TO OPERATION and/or OTHER PROCEDURE.     _________________________________________ _________________________________     ___________________________________  Signature of Patient     Signature of Responsible Person                   Printed Name of Responsible Person                              _________________________________________ ______________________________        ___________________________________  Signature of Witness         Date  Time         Relationship to Patient    STATEMENT OF PHYSICIAN My signature below affirms that prior to the time of the procedure; I have explained to the patient and/or his/her legal representative, the risks and benefits involved in the proposed treatment and any reasonable alternative to the proposed treatment.  I have also explained the risks and benefits involved in refusal of the proposed treatment and alternatives to the proposed treatment and have answered the patient's questions.  If I have a significant financial interest in a co-management agreement or a significant financial interest in any product or implant, or other significant relationship used in this procedure/surgery, I have disclosed this and had a discussion with my patient.     _______________________________________________________________ _____________________________  (Signature of Physician)                                                                                         (Date)                                   (Time)  Patient Name: Maya Jiménez    : 10/20/1960   Printed: 2023      Medical Record #: [id number]                                              Page 1 of 1

## (undated) NOTE — LETTER
3/6/2023          To Whom It May Concern:    Jam Zapien is currently under my medical care. He may not return to work at this time. .  Please excuse Sin for 1 day. He may return to work on 3/7/23. Activity is restricted as follows: no heavy lifting over 10 lbs. If you require additional information please contact our office.         Sincerely,          SAIDA Cam

## (undated) NOTE — LETTER
Date & Time: 3/1/2023, 2:03 PM  Patient: Peter Almaguer  Encounter Provider(s):    Eugene Verasr, MD       To Whom It May Concern:    Jomar Jnoes was seen and treated in our department on 3/1/2023, please excuse him from work. He may return tomorrow 3/2/2023.     If you have any questions or concerns, please do not hesitate to call.        _____________________________  Physician/APC Signature

## (undated) NOTE — LETTER
[address] [place] [address], [place]  Authorization for Surgical Operation and Procedure                                                                                           1. I hereby authorize Ben Walker MD, my physician and his/her assistants (if applicable), which may include medical students, residents, and/or fellows, to perform the following surgical operation/ procedure and administer such anesthesia as may be determined necessary by my physician: Operation/Procedure name (s) Cystoscopy, bipolar transurethral resection of the prostate on [facility]   2. I recognize that during the surgical operation/procedure, unforeseen conditions may necessitate additional or different procedures than those listed above. I, therefore, further authorize and request that the above-named surgeon, assistants, or designees perform such procedures as are, in their judgment, necessary and desirable. 3.   My surgeon/physician has discussed prior to my surgery the potential benefits, risks and side effects of this procedure; the likelihood of achieving goals; and potential problems that might occur during recuperation. They also discussed reasonable alternatives to the procedure, including risks, benefits, and side effects related to the alternatives and risks related to not receiving this procedure. I have had all my questions answered and I acknowledge that no guarantee has been made as to the result that may be obtained. 4.   Should the need arise during my operation/procedure, which includes change of level of care prior to discharge, I also consent to the administration of blood and/or blood products. Further, I understand that despite careful testing and screening of blood or blood products by collecting agencies, I may still be subject to ill effects as a result of receiving a blood transfusion and/or blood products.   The following are some, but not all, of the potential risks that can occur: fever and allergic reactions, hemolytic reactions, transmission of diseases such as Hepatitis, AIDS and Cytomegalovirus (CMV) and fluid overload. In the event that I wish to have an autologous transfusion of my own blood, or a directed donor transfusion, I will discuss this with my physician. Check only if Refusing Blood or Blood Products  I understand refusal of blood or blood products as deemed necessary by my physician may have serious consequences to my condition to include possible death. I hereby assume responsibility for my refusal and release the hospital, its personnel, and my physicians from any responsibility for the consequences of my refusal.    o  Refuse   5. I authorize the use of any specimen, organs, tissues, body parts or foreign objects that may be removed from my body during the operation/procedure for diagnosis, research or teaching purposes and their subsequent disposal by hospital authorities. I also authorize the release of specimen test results and/or written reports to my treating physician on the hospital medical staff or other referring or consulting physicians involved in my care, at the discretion of the Pathologist or my treating physician. 6.   I consent to the photographing or videotaping of the operations or procedures to be performed, including appropriate portions of my body for medical, scientific, or educational purposes, provided my identity is not revealed by the pictures or by descriptive texts accompanying them. If the procedure has been photographed/videotaped, the surgeon will obtain the original picture, image, videotape or CD. The hospital will not be responsible for storage, release or maintenance of the picture, image, tape or CD.    7.   I consent to the presence of a  or observers in the operating room as deemed necessary by my physician or their designees.     8.   I recognize that in the event my procedure results in extended X-Ray/fluoroscopy time, I may develop a skin reaction. 9. If I have a Do Not Attempt Resuscitation (DNAR) order in place, that status will be suspended while in the operating room, procedural suite, and during the recovery period unless otherwise explicitly stated by me (or a person authorized to consent on my behalf). The surgeon or my attending physician will determine when the applicable recovery period ends for purposes of reinstating the DNAR order. 10. Patients having a sterilization procedure: I understand that if the procedure is successful the results will be permanent and it will therefore be impossible for me to inseminate, conceive, or bear children. I also understand that the procedure is intended to result in sterility, although the result has not been guaranteed. 11. I acknowledge that my physician has explained sedation/analgesia administration to me including the risk and benefits I consent to the administration of sedation/analgesia as may be necessary or desirable in the judgment of my physician. I CERTIFY THAT I HAVE READ AND FULLY UNDERSTAND THE ABOVE CONSENT TO OPERATION and/or OTHER PROCEDURE.     _________________________________________ _________________________________     ___________________________________  Signature of Patient     Signature of Responsible Person                   Printed Name of Responsible Person                              _________________________________________ ______________________________        ___________________________________  Signature of Witness         Date  Time         Relationship to Patient    STATEMENT OF PHYSICIAN My signature below affirms that prior to the time of the procedure; I have explained to the patient and/or his/her legal representative, the risks and benefits involved in the proposed treatment and any reasonable alternative to the proposed treatment.  I have also explained the risks and benefits involved in refusal of the proposed treatment and alternatives to the proposed treatment and have answered the patient's questions.  If I have a significant financial interest in a co-management agreement or a significant financial interest in any product or implant, or other significant relationship used in this procedure/surgery, I have disclosed this and had a discussion with my patient.     _______________________________________________________________ _____________________________  (Signature of Physician)                                                                                         (Date)                                   (Time)  Patient Name: Lauren Jones    : 10/20/1960   Printed: 2023      Medical Record #: [id number]                                              Page 1 of 1

## (undated) NOTE — LETTER
[facility] 5SW/SE  [address]  [address] [place] [address]  Dept: [phone]  Loc: [phone]      February 11, 2023    Patient: Lindsey Delgado   Date of Visit: 1/20/2023       To Whom It May Concern:    Lyndsey Santamaria was seen and treated in our Hospital from 2/8/2023 - 2/11/2023. He should not return to work until Friday 2/17/23 and should refrain from any heavy lifting for another 2 week. If you have any questions or concerns, please don't hesitate to call. Encounter Provider(s):     Sneha Ying MD     9:16 AM  2/11/2023